# Patient Record
Sex: MALE | Race: WHITE | NOT HISPANIC OR LATINO | Employment: UNEMPLOYED | URBAN - METROPOLITAN AREA
[De-identification: names, ages, dates, MRNs, and addresses within clinical notes are randomized per-mention and may not be internally consistent; named-entity substitution may affect disease eponyms.]

---

## 2023-02-04 PROBLEM — I10 ESSENTIAL HYPERTENSION: Status: ACTIVE | Noted: 2023-02-04

## 2023-02-04 PROBLEM — E11.22 TYPE 2 DM WITH CKD STAGE 3 AND HYPERTENSION (MULTI): Status: ACTIVE | Noted: 2023-02-04

## 2023-02-04 PROBLEM — S69.92XA: Status: ACTIVE | Noted: 2023-02-04

## 2023-02-04 PROBLEM — I12.9 TYPE 2 DM WITH CKD STAGE 3 AND HYPERTENSION (MULTI): Status: ACTIVE | Noted: 2023-02-04

## 2023-02-04 PROBLEM — E78.5 HYPERLIPIDEMIA: Status: ACTIVE | Noted: 2023-02-04

## 2023-02-04 PROBLEM — L91.8 CUTANEOUS SKIN TAGS: Status: ACTIVE | Noted: 2023-02-04

## 2023-02-04 PROBLEM — N18.30 TYPE 2 DM WITH CKD STAGE 3 AND HYPERTENSION (MULTI): Status: ACTIVE | Noted: 2023-02-04

## 2023-02-04 PROBLEM — E66.01 SEVERE OBESITY (BMI 35.0-39.9) WITH COMORBIDITY (MULTI): Status: ACTIVE | Noted: 2023-02-04

## 2023-02-04 PROBLEM — J30.9 ALLERGIC RHINITIS: Status: ACTIVE | Noted: 2023-02-04

## 2023-02-04 PROBLEM — N18.30 CHRONIC KIDNEY DISEASE (CKD), STAGE III (MODERATE) (MULTI): Status: ACTIVE | Noted: 2023-02-04

## 2023-02-04 PROBLEM — K21.9 GERD (GASTROESOPHAGEAL REFLUX DISEASE): Status: ACTIVE | Noted: 2023-02-04

## 2023-02-04 PROBLEM — H52.13 MYOPIA OF BOTH EYES: Status: ACTIVE | Noted: 2023-02-04

## 2023-02-04 RX ORDER — LANCETS
EACH MISCELLANEOUS
COMMUNITY
End: 2023-12-20 | Stop reason: SDUPTHER

## 2023-02-04 RX ORDER — BLOOD SUGAR DIAGNOSTIC
STRIP MISCELLANEOUS
COMMUNITY
Start: 2020-04-20 | End: 2023-12-20 | Stop reason: SDUPTHER

## 2023-02-04 RX ORDER — ATORVASTATIN CALCIUM 40 MG/1
1 TABLET, FILM COATED ORAL DAILY
COMMUNITY
Start: 2013-11-22 | End: 2023-03-13

## 2023-02-04 RX ORDER — LOSARTAN POTASSIUM 25 MG/1
1 TABLET ORAL DAILY
COMMUNITY
Start: 2020-09-21 | End: 2023-03-23 | Stop reason: ALTCHOICE

## 2023-02-04 RX ORDER — SEMAGLUTIDE 1.34 MG/ML
1 INJECTION, SOLUTION SUBCUTANEOUS
COMMUNITY
Start: 2022-03-01 | End: 2023-03-23 | Stop reason: SDUPTHER

## 2023-02-04 RX ORDER — OMEPRAZOLE 20 MG/1
20 CAPSULE, DELAYED RELEASE ORAL
COMMUNITY
Start: 2017-07-25 | End: 2023-03-13

## 2023-03-12 DIAGNOSIS — I10 PRIMARY HYPERTENSION: ICD-10-CM

## 2023-03-12 DIAGNOSIS — K21.9 LARYNGOPHARYNGEAL REFLUX: ICD-10-CM

## 2023-03-12 DIAGNOSIS — E78.5 HYPERLIPIDEMIA, UNSPECIFIED HYPERLIPIDEMIA TYPE: ICD-10-CM

## 2023-03-13 RX ORDER — OMEPRAZOLE 20 MG/1
20 CAPSULE, DELAYED RELEASE ORAL
Qty: 90 CAPSULE | Refills: 0 | Status: SHIPPED | OUTPATIENT
Start: 2023-03-13 | End: 2023-03-23 | Stop reason: SDUPTHER

## 2023-03-13 RX ORDER — LOSARTAN POTASSIUM 50 MG/1
50 TABLET ORAL DAILY
Qty: 90 TABLET | Refills: 0 | Status: SHIPPED | OUTPATIENT
Start: 2023-03-13 | End: 2023-03-23 | Stop reason: SDUPTHER

## 2023-03-13 RX ORDER — ATORVASTATIN CALCIUM 40 MG/1
40 TABLET, FILM COATED ORAL DAILY
Qty: 90 TABLET | Refills: 0 | Status: SHIPPED | OUTPATIENT
Start: 2023-03-13 | End: 2023-03-23 | Stop reason: SDUPTHER

## 2023-03-23 ENCOUNTER — OFFICE VISIT (OUTPATIENT)
Dept: PRIMARY CARE | Facility: CLINIC | Age: 58
End: 2023-03-23
Payer: COMMERCIAL

## 2023-03-23 VITALS
HEART RATE: 65 BPM | SYSTOLIC BLOOD PRESSURE: 126 MMHG | HEIGHT: 75 IN | DIASTOLIC BLOOD PRESSURE: 80 MMHG | WEIGHT: 294 LBS | BODY MASS INDEX: 36.56 KG/M2

## 2023-03-23 DIAGNOSIS — K21.9 GASTROESOPHAGEAL REFLUX DISEASE WITHOUT ESOPHAGITIS: ICD-10-CM

## 2023-03-23 DIAGNOSIS — I10 ESSENTIAL HYPERTENSION: Primary | ICD-10-CM

## 2023-03-23 DIAGNOSIS — N18.30 TYPE 2 DM WITH CKD STAGE 3 AND HYPERTENSION (MULTI): ICD-10-CM

## 2023-03-23 DIAGNOSIS — K21.9 LARYNGOPHARYNGEAL REFLUX: ICD-10-CM

## 2023-03-23 DIAGNOSIS — E78.5 HYPERLIPIDEMIA, UNSPECIFIED HYPERLIPIDEMIA TYPE: ICD-10-CM

## 2023-03-23 DIAGNOSIS — I12.9 TYPE 2 DM WITH CKD STAGE 3 AND HYPERTENSION (MULTI): ICD-10-CM

## 2023-03-23 DIAGNOSIS — Z23 NEED FOR SHINGLES VACCINE: ICD-10-CM

## 2023-03-23 DIAGNOSIS — I10 PRIMARY HYPERTENSION: ICD-10-CM

## 2023-03-23 DIAGNOSIS — E11.22 TYPE 2 DM WITH CKD STAGE 3 AND HYPERTENSION (MULTI): ICD-10-CM

## 2023-03-23 PROCEDURE — 3079F DIAST BP 80-89 MM HG: CPT | Performed by: STUDENT IN AN ORGANIZED HEALTH CARE EDUCATION/TRAINING PROGRAM

## 2023-03-23 PROCEDURE — 90471 IMMUNIZATION ADMIN: CPT | Performed by: STUDENT IN AN ORGANIZED HEALTH CARE EDUCATION/TRAINING PROGRAM

## 2023-03-23 PROCEDURE — 3044F HG A1C LEVEL LT 7.0%: CPT | Performed by: STUDENT IN AN ORGANIZED HEALTH CARE EDUCATION/TRAINING PROGRAM

## 2023-03-23 PROCEDURE — 4010F ACE/ARB THERAPY RXD/TAKEN: CPT | Performed by: STUDENT IN AN ORGANIZED HEALTH CARE EDUCATION/TRAINING PROGRAM

## 2023-03-23 PROCEDURE — 3074F SYST BP LT 130 MM HG: CPT | Performed by: STUDENT IN AN ORGANIZED HEALTH CARE EDUCATION/TRAINING PROGRAM

## 2023-03-23 PROCEDURE — 99214 OFFICE O/P EST MOD 30 MIN: CPT | Performed by: STUDENT IN AN ORGANIZED HEALTH CARE EDUCATION/TRAINING PROGRAM

## 2023-03-23 PROCEDURE — 90750 HZV VACC RECOMBINANT IM: CPT | Performed by: STUDENT IN AN ORGANIZED HEALTH CARE EDUCATION/TRAINING PROGRAM

## 2023-03-23 PROCEDURE — 1036F TOBACCO NON-USER: CPT | Performed by: STUDENT IN AN ORGANIZED HEALTH CARE EDUCATION/TRAINING PROGRAM

## 2023-03-23 RX ORDER — SEMAGLUTIDE 1.34 MG/ML
1 INJECTION, SOLUTION SUBCUTANEOUS
Qty: 3 ML | Refills: 6 | Status: SHIPPED | OUTPATIENT
Start: 2023-03-23 | End: 2023-11-10 | Stop reason: ALTCHOICE

## 2023-03-23 RX ORDER — ATORVASTATIN CALCIUM 40 MG/1
40 TABLET, FILM COATED ORAL DAILY
Qty: 90 TABLET | Refills: 1 | Status: SHIPPED | OUTPATIENT
Start: 2023-03-23 | End: 2023-10-25 | Stop reason: SDUPTHER

## 2023-03-23 RX ORDER — OMEPRAZOLE 20 MG/1
20 CAPSULE, DELAYED RELEASE ORAL
Qty: 90 CAPSULE | Refills: 1 | Status: SHIPPED | OUTPATIENT
Start: 2023-03-23 | End: 2024-04-18 | Stop reason: SDUPTHER

## 2023-03-23 RX ORDER — LOSARTAN POTASSIUM 50 MG/1
50 TABLET ORAL DAILY
Qty: 90 TABLET | Refills: 1 | Status: SHIPPED | OUTPATIENT
Start: 2023-03-23 | End: 2023-10-25 | Stop reason: SDUPTHER

## 2023-03-23 NOTE — PROGRESS NOTES
Subjective   Patient ID: Cooper Gibbons is a 57 y.o. male who presents for Hypertension, Hyperlipidemia, and Diabetes.  Today he is accompanied by alone.     HPI    1.  Hypertension  Continues to take losartan 50 mg daily as indicated in the chart  This was just recently increased at his last physical  He has been doing very well with this medication  Today his blood pressure was well within normal limits at 126/80  Denies any cardiopulmonary symptoms  Requesting refills to his mail out pharmacy    2.  Diabetes mellitus with CKD 3  Continues to take Ozempic as indicated in the chart  Doing very well with this medication and his hemoglobin A1c is stable and below 6.5%  Feels well and requesting refills to be sent out to his mail out pharmacy  Does have a history of possibly having RIS and did an MRI which showed a negative result  He is happy to hear that this testing was normal  Wishes to discuss this slightly today as well    3.  GERD  Continues to take omeprazole 20 mg daily as an in the chart  Feels very well in regards to his acid reflux  Requesting refills and feels asymptomatic    4.  Hyperlipidemia  Continues to take atorvastatin 40 mg daily as indicated in chart  Cholesterol appears to be stable  Denies any myalgias  Requesting refills to his mail out pharmacy    5.  Need for shingles vaccine  Had his shingles vaccine #1 at his last visit/physical  Willing to get the second and final dosage today      Current Outpatient Medications on File Prior to Visit   Medication Sig Dispense Refill    blood sugar diagnostic (Accu-Chek Guide test strips) strip TEST TWICE DAILY; MORNING FASTING AND 1-2 HOURS AFTER LARGEST MEAL      lancets misc Chec kblood sugars 2 times daily      multivitamin with minerals tablet Take 1 tablet by mouth once daily.      needles, disposable (BD INTRADERMAL BEVEL NEEDLES MISC) Micro 32Gx 6mm; use as directed for once daily injection      [DISCONTINUED] atorvastatin (Lipitor) 40 mg tablet  "Take 1 tablet (40 mg) by mouth once daily. 90 tablet 0    [DISCONTINUED] losartan (Cozaar) 50 mg tablet Take 1 tablet (50 mg) by mouth once daily. 90 tablet 0    [DISCONTINUED] omeprazole (PriLOSEC) 20 mg DR capsule Take 1 capsule (20 mg) by mouth once daily in the morning. Take before meals. 90 capsule 0    [DISCONTINUED] semaglutide (Ozempic) 1 mg/dose (4 mg/3 mL) pen injector Inject 0.75 mL (1 mg) under the skin 1 (one) time per week.      NON FORMULARY Alcohol swabs: use as directed      [DISCONTINUED] losartan (Cozaar) 25 mg tablet Take 1 tablet (25 mg) by mouth once daily.       No current facility-administered medications on file prior to visit.          Review of Systems  All pertinent positive symptoms are included in the history of present illness.  All other systems have been reviewed and are negative and noncontributory to this patient's current ailments.     Objective   /80 (BP Location: Left arm, Patient Position: Sitting, BP Cuff Size: Large adult)   Pulse 65   Ht 1.905 m (6' 3\")   Wt 133 kg (294 lb)   BMI 36.75 kg/m²   BSA: 2.65 meters squared  No visits with results within 1 Month(s) from this visit.   Latest known visit with results is:   [facility] Encounter on 01/28/2023   Component Date Value Ref Range Status    Hemoglobin A1C 01/28/2023 6.2 (A)  % Final    Comment:      Diagnosis of Diabetes-Adults   Non-Diabetic: < or = 5.6%   Increased risk for developing diabetes: 5.7-6.4%   Diagnostic of diabetes: > or = 6.5%  .       Monitoring of Diabetes                Age (y)     Therapeutic Goal (%)   Adults:          >18           <7.0   Pediatrics:    13-18           <7.5                   7-12           <8.0                   0- 6            7.5-8.5   American Diabetes Association. Diabetes Care 33(S1), Jan 2010.      Estimated Average Glucose 01/28/2023 131  MG/DL Final    PSA 01/28/2023 0.69  0.00 - 4.00 ng/mL Final    Comment: The FDA requires that the method used for PSA assay be "   reported to the physician. Values obtained with different   assay methods must not be used interchangeably. This test   was performed at [facility] using the Siemens  OhLifellLookIt PSA method, which is a sandwich immunoassay using   chemiluminescence for quantitation. The assay is approved  for measurement of prostate-specific antigen (PSA) in   serum and may be used in conjunction with a digital rectal  examination in men 50 years and older as an aid in   detection of prostate cancer.   5-Alpha-reductase inhibitors (e.g. Proscar, Finasteride,   Avodart, Dutasteride and Brii) for the treatment of BPH   have been shown to lower PSA levels by an average of 50%   after 6 months of treatment.      Glucose 01/28/2023 123 (H)  74 - 99 mg/dL Final    Sodium 01/28/2023 140  136 - 145 mmol/L Final    Potassium 01/28/2023 4.1  3.5 - 5.3 mmol/L Final    Chloride 01/28/2023 105  98 - 107 mmol/L Final    Bicarbonate 01/28/2023 28  21 - 32 mmol/L Final    Anion Gap 01/28/2023 11  10 - 20 mmol/L Final    Urea Nitrogen 01/28/2023 17  6 - 23 mg/dL Final    Creatinine 01/28/2023 1.48 (H)  0.50 - 1.30 mg/dL Final    GFR MALE 01/28/2023 55 (A)  >90 mL/min/1.73m2 Final    Comment:  CALCULATIONS OF ESTIMATED GFR ARE PERFORMED   USING THE 2021 CKD-EPI STUDY REFIT EQUATION   WITHOUT THE RACE VARIABLE FOR THE IDMS-TRACEABLE   CREATININE METHODS.    https://jasn.asnjournals.org/content/early/2021/09/22/ASN.6625655826      Calcium 01/28/2023 9.3  8.6 - 10.6 mg/dL Final    Albumin 01/28/2023 4.1  3.4 - 5.0 g/dL Final    Alkaline Phosphatase 01/28/2023 116  33 - 120 U/L Final    Total Protein 01/28/2023 6.7  6.4 - 8.2 g/dL Final    AST 01/28/2023 20  9 - 39 U/L Final    Total Bilirubin 01/28/2023 0.8  0.0 - 1.2 mg/dL Final    ALT (SGPT) 01/28/2023 39  10 - 52 U/L Final    Comment:  Patients treated with Sulfasalazine may generate    falsely decreased results for ALT.      WBC 01/28/2023 5.9  4.4 - 11.3 x10E9/L Final    nRBC  01/28/2023 0.0  0.0 - 0.0 /100 WBC Final    RBC 01/28/2023 4.85  4.50 - 5.90 x10E12/L Final    Hemoglobin 01/28/2023 14.5  13.5 - 17.5 g/dL Final    Hematocrit 01/28/2023 44.5  41.0 - 52.0 % Final    MCV 01/28/2023 92  80 - 100 fL Final    MCHC 01/28/2023 32.6  32.0 - 36.0 g/dL Final    Platelets 01/28/2023 271  150 - 450 x10E9/L Final    RDW 01/28/2023 12.8  11.5 - 14.5 % Final    Neutrophils % 01/28/2023 62.0  40.0 - 80.0 % Final    Immature Granulocytes %, Automated 01/28/2023 0.0  0.0 - 0.9 % Final    Comment:  Immature Granulocyte Count (IG) includes promyelocytes,    myelocytes and metamyelocytes but does not include bands.   Percent differential counts (%) should be interpreted in the   context of the absolute cell counts (cells/L).      Lymphocytes % 01/28/2023 29.1  13.0 - 44.0 % Final    Monocytes % 01/28/2023 6.2  2.0 - 10.0 % Final    Eosinophils % 01/28/2023 2.2  0.0 - 6.0 % Final    Basophils % 01/28/2023 0.5  0.0 - 2.0 % Final    Neutrophils Absolute 01/28/2023 3.63  1.20 - 7.70 x10E9/L Final    Lymphocytes Absolute 01/28/2023 1.70  1.20 - 4.80 x10E9/L Final    Monocytes Absolute 01/28/2023 0.36  0.10 - 1.00 x10E9/L Final    Eosinophils Absolute 01/28/2023 0.13  0.00 - 0.70 x10E9/L Final    Basophils Absolute 01/28/2023 0.03  0.00 - 0.10 x10E9/L Final    Cholesterol 01/28/2023 145  0 - 199 mg/dL Final    Comment: .      AGE      DESIRABLE   BORDERLINE HIGH   HIGH     0-19 Y     0 - 169       170 - 199     >/= 200    20-24 Y     0 - 189       190 - 224     >/= 225         >24 Y     0 - 199       200 - 239     >/= 240   **All ranges are based on fasting samples. Specific   therapeutic targets will vary based on patient-specific   cardiac risk.  .   Pediatric guidelines reference:Pediatrics 2011, 128(S5).   Adult guidelines reference: NCEP ATPIII Guidelines,     ARLETTE 2001, 258:2486-97  .   Venipuncture immediately after or during the    administration of Metamizole may lead to falsely   low results.  Testing should be performed immediately   prior to Metamizole dosing.      HDL 01/28/2023 36.6 (A)  mg/dL Final    Comment: .      AGE      VERY LOW   LOW     NORMAL    HIGH       0-19 Y       < 35   < 40     40-45     ----    20-24 Y       ----   < 40       >45     ----      >24 Y       ----   < 40     40-60      >60  .      Cholesterol/HDL Ratio 01/28/2023 4.0   Final    Comment: REF VALUES  DESIRABLE  < 3.4  HIGH RISK  > 5.0      LDL 01/28/2023 67  0 - 99 mg/dL Final    Comment: .                           NEAR      BORD      AGE      DESIRABLE  OPTIMAL    HIGH     HIGH     VERY HIGH     0-19 Y     0 - 109     ---    110-129   >/= 130     ----    20-24 Y     0 - 119     ---    120-159   >/= 160     ----      >24 Y     0 -  99   100-129  130-159   160-189     >/=190  .      VLDL 01/28/2023 41 (H)  0 - 40 mg/dL Final    Triglycerides 01/28/2023 207 (H)  0 - 149 mg/dL Final    Comment: .      AGE      DESIRABLE   BORDERLINE HIGH   HIGH     VERY HIGH   0 D-90 D    19 - 174         ----         ----        ----  91 D- 9 Y     0 -  74        75 -  99     >/= 100      ----    10-19 Y     0 -  89        90 - 129     >/= 130      ----    20-24 Y     0 - 114       115 - 149     >/= 150      ----         >24 Y     0 - 149       150 - 199    200- 499    >/= 500  .   Venipuncture immediately after or during the    administration of Metamizole may lead to falsely   low results. Testing should be performed immediately   prior to Metamizole dosing.      Non HDL Cholesterol 01/28/2023 108  mg/dL Final    Comment:     AGE      DESIRABLE   BORDERLINE HIGH   HIGH     VERY HIGH     0-19 Y     0 - 119       120 - 144     >/= 145    >/= 160    20-24 Y     0 - 149       150 - 189     >/= 190      ----         >24 Y    30 MG/DL ABOVE LDL CHOLESTEROL GOAL  .      TSH 01/28/2023 1.83  0.44 - 3.98 mIU/L Final    Comment:  TSH testing is performed using different testing    methodology at [facility] than at other    system  [facility]. Direct result comparisons should    only be made within the same method.      Hepatitis C Ab 01/28/2023 NONREACTIVE  NONREACTIVE Final    Comment:  Results from patients taking biotin supplements or receiving   high-dose biotin therapy should be interpreted with caution   due to possible interference with this test. Providers may    contact their local laboratory for further information.      ALBUMIN (MG/L) IN URINE 01/28/2023 <7.0  Not Established mg/L Final    Albumin/Creatine Ratio 01/28/2023 SEE COMMENT  0.0 - 30.0 ug/mg crt Final    Comment: One or more analytes used in this calculation   is outside of the analytical measurement range.  Calculation cannot be performed.      Creatinine, Urine 01/28/2023 127.0  20.0 - 370.0 mg/dL Final       Physical Exam  CONSTITUTIONAL - well nourished, well developed, looks like stated age, in no acute distress, not ill-appearing, and not tired appearing  SKIN - normal skin color and pigmentation, normal skin turgor without rash, lesions, or nodules visualized  HEAD - no trauma, normocephalic  EYES - normal external exam  CHEST -no distressed breathing, good effort, heart regular rate and rhythm, no murmurs, rubs, or gallops, lungs clear to auscultation bilaterally, no wheezing, rhonchi, or any other acute findings  EXTREMITIES - no edema, no deformities  NEUROLOGICAL - normal balance, normal motor, no ataxia  PSYCHIATRIC - alert, pleasant and cordial, age-appropriate    Assessment/Plan   1.  Hypertension  Stable, no changes recommended at this time  Continue losartan 50 mg  Continue monitoring blood pressures at home with ideal range below 130/80    2.  Diabetes mellitus with CKD 3  Continue taking your Ozempic as currently prescribed  Refill sent to your mail out pharmacy  Sugar has been stable and no recommendations to change at this time    3.  GERD  Stable without any changes recommended  Continue omeprazole 20 mg  Refill sent to your mail out pharmacy    4.   Hyperlipidemia  Continue taking atorvastatin 40 mg daily without any changes  Lab work looks good and stable  This was sent out to your mail out pharmacy    5.  Need for shingles vaccine  All questions were answered and you were counseled on immunization(s) in detail and vaccination was provided  This was your second and final dosage of your shingles vaccine

## 2023-07-07 ENCOUNTER — TELEPHONE (OUTPATIENT)
Dept: PRIMARY CARE | Facility: CLINIC | Age: 58
End: 2023-07-07
Payer: COMMERCIAL

## 2023-07-07 NOTE — TELEPHONE ENCOUNTER
Pts glucose monitor is not working and is asking for a new Rx for a new monitor. He asked for the accucheck glucose monitor but could not find this specifically in Epic.

## 2023-10-25 DIAGNOSIS — I10 PRIMARY HYPERTENSION: ICD-10-CM

## 2023-10-25 DIAGNOSIS — E78.5 HYPERLIPIDEMIA, UNSPECIFIED HYPERLIPIDEMIA TYPE: ICD-10-CM

## 2023-10-25 DIAGNOSIS — N18.30 TYPE 2 DM WITH CKD STAGE 3 AND HYPERTENSION (MULTI): ICD-10-CM

## 2023-10-25 DIAGNOSIS — I12.9 TYPE 2 DM WITH CKD STAGE 3 AND HYPERTENSION (MULTI): ICD-10-CM

## 2023-10-25 DIAGNOSIS — E11.22 TYPE 2 DM WITH CKD STAGE 3 AND HYPERTENSION (MULTI): ICD-10-CM

## 2023-10-26 RX ORDER — ATORVASTATIN CALCIUM 40 MG/1
40 TABLET, FILM COATED ORAL DAILY
Qty: 30 TABLET | Refills: 0 | Status: SHIPPED | OUTPATIENT
Start: 2023-10-26 | End: 2023-11-10 | Stop reason: SDUPTHER

## 2023-10-26 RX ORDER — LOSARTAN POTASSIUM 50 MG/1
50 TABLET ORAL DAILY
Qty: 30 TABLET | Refills: 0 | Status: SHIPPED | OUTPATIENT
Start: 2023-10-26 | End: 2023-11-10 | Stop reason: SDUPTHER

## 2023-11-10 ENCOUNTER — OFFICE VISIT (OUTPATIENT)
Dept: PRIMARY CARE | Facility: CLINIC | Age: 58
End: 2023-11-10
Payer: COMMERCIAL

## 2023-11-10 VITALS
HEIGHT: 75 IN | HEART RATE: 84 BPM | WEIGHT: 301.2 LBS | DIASTOLIC BLOOD PRESSURE: 80 MMHG | BODY MASS INDEX: 37.45 KG/M2 | SYSTOLIC BLOOD PRESSURE: 138 MMHG

## 2023-11-10 DIAGNOSIS — E78.5 HYPERLIPIDEMIA, UNSPECIFIED HYPERLIPIDEMIA TYPE: ICD-10-CM

## 2023-11-10 DIAGNOSIS — K21.9 LARYNGOPHARYNGEAL REFLUX: ICD-10-CM

## 2023-11-10 DIAGNOSIS — E11.22 TYPE 2 DM WITH CKD STAGE 3 AND HYPERTENSION (MULTI): ICD-10-CM

## 2023-11-10 DIAGNOSIS — I10 PRIMARY HYPERTENSION: ICD-10-CM

## 2023-11-10 DIAGNOSIS — E66.01 SEVERE OBESITY (BMI 35.0-39.9) WITH COMORBIDITY (MULTI): ICD-10-CM

## 2023-11-10 DIAGNOSIS — N18.30 TYPE 2 DM WITH CKD STAGE 3 AND HYPERTENSION (MULTI): ICD-10-CM

## 2023-11-10 DIAGNOSIS — N18.30 STAGE 3 CHRONIC KIDNEY DISEASE, UNSPECIFIED WHETHER STAGE 3A OR 3B CKD (MULTI): ICD-10-CM

## 2023-11-10 DIAGNOSIS — I12.9 TYPE 2 DM WITH CKD STAGE 3 AND HYPERTENSION (MULTI): ICD-10-CM

## 2023-11-10 DIAGNOSIS — Z23 INFLUENZA VACCINE NEEDED: ICD-10-CM

## 2023-11-10 DIAGNOSIS — Z12.11 COLON CANCER SCREENING: ICD-10-CM

## 2023-11-10 DIAGNOSIS — Z23 NEED FOR PNEUMOCOCCAL VACCINE: ICD-10-CM

## 2023-11-10 DIAGNOSIS — Z00.00 HEALTHCARE MAINTENANCE: Primary | ICD-10-CM

## 2023-11-10 PROCEDURE — 90686 IIV4 VACC NO PRSV 0.5 ML IM: CPT | Performed by: STUDENT IN AN ORGANIZED HEALTH CARE EDUCATION/TRAINING PROGRAM

## 2023-11-10 PROCEDURE — 90677 PCV20 VACCINE IM: CPT | Performed by: STUDENT IN AN ORGANIZED HEALTH CARE EDUCATION/TRAINING PROGRAM

## 2023-11-10 PROCEDURE — 90471 IMMUNIZATION ADMIN: CPT | Performed by: STUDENT IN AN ORGANIZED HEALTH CARE EDUCATION/TRAINING PROGRAM

## 2023-11-10 PROCEDURE — 93000 ELECTROCARDIOGRAM COMPLETE: CPT | Performed by: STUDENT IN AN ORGANIZED HEALTH CARE EDUCATION/TRAINING PROGRAM

## 2023-11-10 PROCEDURE — 99214 OFFICE O/P EST MOD 30 MIN: CPT | Performed by: STUDENT IN AN ORGANIZED HEALTH CARE EDUCATION/TRAINING PROGRAM

## 2023-11-10 PROCEDURE — 3044F HG A1C LEVEL LT 7.0%: CPT | Performed by: STUDENT IN AN ORGANIZED HEALTH CARE EDUCATION/TRAINING PROGRAM

## 2023-11-10 PROCEDURE — 3075F SYST BP GE 130 - 139MM HG: CPT | Performed by: STUDENT IN AN ORGANIZED HEALTH CARE EDUCATION/TRAINING PROGRAM

## 2023-11-10 PROCEDURE — 90472 IMMUNIZATION ADMIN EACH ADD: CPT | Performed by: STUDENT IN AN ORGANIZED HEALTH CARE EDUCATION/TRAINING PROGRAM

## 2023-11-10 PROCEDURE — 4010F ACE/ARB THERAPY RXD/TAKEN: CPT | Performed by: STUDENT IN AN ORGANIZED HEALTH CARE EDUCATION/TRAINING PROGRAM

## 2023-11-10 PROCEDURE — 99396 PREV VISIT EST AGE 40-64: CPT | Performed by: STUDENT IN AN ORGANIZED HEALTH CARE EDUCATION/TRAINING PROGRAM

## 2023-11-10 PROCEDURE — 3079F DIAST BP 80-89 MM HG: CPT | Performed by: STUDENT IN AN ORGANIZED HEALTH CARE EDUCATION/TRAINING PROGRAM

## 2023-11-10 PROCEDURE — 1036F TOBACCO NON-USER: CPT | Performed by: STUDENT IN AN ORGANIZED HEALTH CARE EDUCATION/TRAINING PROGRAM

## 2023-11-10 RX ORDER — ATORVASTATIN CALCIUM 40 MG/1
40 TABLET, FILM COATED ORAL DAILY
Qty: 90 TABLET | Refills: 1 | Status: SHIPPED | OUTPATIENT
Start: 2023-11-10 | End: 2024-04-18 | Stop reason: SDUPTHER

## 2023-11-10 RX ORDER — OMEPRAZOLE 20 MG/1
20 CAPSULE, DELAYED RELEASE ORAL
Qty: 90 CAPSULE | Refills: 1 | Status: CANCELLED | OUTPATIENT
Start: 2023-11-10

## 2023-11-10 RX ORDER — SEMAGLUTIDE 1.34 MG/ML
1 INJECTION, SOLUTION SUBCUTANEOUS
Qty: 3 ML | Refills: 6 | Status: CANCELLED | OUTPATIENT
Start: 2023-11-10

## 2023-11-10 RX ORDER — LOSARTAN POTASSIUM 100 MG/1
100 TABLET ORAL DAILY
Qty: 90 TABLET | Refills: 1 | Status: SHIPPED | OUTPATIENT
Start: 2023-11-10 | End: 2024-04-18 | Stop reason: SDUPTHER

## 2023-11-10 ASSESSMENT — PATIENT HEALTH QUESTIONNAIRE - PHQ9
2. FEELING DOWN, DEPRESSED OR HOPELESS: NOT AT ALL
SUM OF ALL RESPONSES TO PHQ9 QUESTIONS 1 AND 2: 0
1. LITTLE INTEREST OR PLEASURE IN DOING THINGS: NOT AT ALL

## 2023-11-10 NOTE — PROGRESS NOTES
"Subjective   Patient ID: Cooper Gibbons \"Alirio\" is a 58 y.o. male who presents for Annual Exam.  Today he is accompanied by alone.     HPI  Currently works for [employer]    1.  Healthcare maintenance  Overall patient is doing well.   Immunization: Tdap June 2022; influenza vaccine willing today  Shingrix up-to-date x2; PPSV 23 2021  Willing to discuss PCV 20 and have this provided as well  COVID-19 vaccine x2  Colon Cancer Screening: No family history; Cologuard 2020, due 2023, requesting requisition  Diet: Attempting to eat a better balanced diet  Exercise: Attempting to exercise regularly  Tobacco: Denies use  EtOH: Rarely  Denies any other acute signs/symptoms and willing to do blood work in the near future     2. Hypertension  Continues to take losartan 50 mg daily as indicated in the chart  He has been doing very well with this medication  Today his blood pressure was slightly increased  Denies any cardiopulmonary symptoms  Requesting refills to his mail out pharmacy and willing to increase if necessary     3. Diabetes mellitus with CKD 3  Continues to take Ozempic as indicated in the chart  Doing very well with this medication and his hemoglobin A1c is stable and at 6.2% previously  Feels well and requesting refills to be sent out to his mail out pharmacy  Does have a history of possibly having RIS and did an MRI which showed a negative result  He is happy to hear that this testing was normal  Wishes to discuss this further and even mention about possibility of increasing Ozempic to help with weight loss and further control  Asking if it would be appropriate to increase to 2 mg weekly     4.  GERD  Continues to take omeprazole 20 mg daily as an in the chart  Feels very well in regards to his acid reflux  Does not need refills and states that he uses this on an as-needed basis     5. Hyperlipidemia  Continues to take atorvastatin 40 mg daily as indicated in chart  Cholesterol appears to be " stable  Denies any myalgias  Requesting refills to his mail out pharmacy  Has yet to do a CT cardiac score willing to do so after today's visit    Current Outpatient Medications on File Prior to Visit   Medication Sig Dispense Refill    blood sugar diagnostic (Accu-Chek Guide test strips) strip TEST TWICE DAILY; MORNING FASTING AND 1-2 HOURS AFTER LARGEST MEAL      lancets misc Chec kblood sugars 2 times daily      multivitamin with minerals tablet Take 1 tablet by mouth once daily.      needles, disposable (BD INTRADERMAL BEVEL NEEDLES MISC) Micro 32Gx 6mm; use as directed for once daily injection      NON FORMULARY Alcohol swabs: use as directed      omeprazole (PriLOSEC) 20 mg DR capsule Take 1 capsule (20 mg) by mouth once daily in the morning. Take before meals. 90 capsule 1    [DISCONTINUED] atorvastatin (Lipitor) 40 mg tablet Take 1 tablet (40 mg) by mouth once daily. 30 tablet 0    [DISCONTINUED] losartan (Cozaar) 50 mg tablet Take 1 tablet (50 mg) by mouth once daily. 30 tablet 0    [DISCONTINUED] semaglutide (Ozempic) 1 mg/dose (4 mg/3 mL) pen injector Inject 0.75 mL (1 mg) under the skin 1 (one) time per week. 3 mL 6     No current facility-administered medications on file prior to visit.        No Known Allergies    Immunization History   Administered Date(s) Administered    Flu vaccine (IIV4), preservative free *Check age/dose* 11/10/2023    Hep A, Unspecified 11/22/2013    Hepatitis B vaccine, adult (RECOMBIVAX, ENGERIX) 11/22/2013, 01/15/2014, 05/06/2014    Influenza, seasonal, injectable 10/26/2022    Pfizer Gray Cap SARS-CoV-2 04/09/2022    Pfizer Purple Cap SARS-CoV-2 05/01/2021, 05/22/2021    Pneumococcal conjugate vaccine, 20-valent (PREVNAR 20) 11/10/2023    Pneumococcal polysaccharide vaccine, 23-valent, age 2 years and older (PNEUMOVAX 23) 09/01/2021    SARS-CoV-2, Unspecified 04/09/2022    Tdap vaccine, age 7 year and older (BOOSTRIX) 08/19/2011, 06/05/2022    Zoster vaccine, recombinant,  "adult (SHINGRIX) 10/26/2022, 03/23/2023         Review of Systems  All pertinent positive symptoms are included in the history of present illness.  All other systems have been reviewed and are negative and noncontributory to this patient's current ailments.     Objective   /80 (BP Location: Left arm, Patient Position: Sitting, BP Cuff Size: Large adult)   Pulse 84   Ht 1.905 m (6' 3\")   Wt 137 kg (301 lb 3.2 oz)   BMI 37.65 kg/m²   BSA: 2.69 meters squared  No visits with results within 1 Month(s) from this visit.   Latest known visit with results is:   Legacy Encounter on 01/28/2023   Component Date Value Ref Range Status    Hemoglobin A1C 01/28/2023 6.2 (A)  % Final    Comment:      Diagnosis of Diabetes-Adults   Non-Diabetic: < or = 5.6%   Increased risk for developing diabetes: 5.7-6.4%   Diagnostic of diabetes: > or = 6.5%  .       Monitoring of Diabetes                Age (y)     Therapeutic Goal (%)   Adults:          >18           <7.0   Pediatrics:    13-18           <7.5                   7-12           <8.0                   0- 6            7.5-8.5   American Diabetes Association. Diabetes Care 33(S1), Jan 2010.      Estimated Average Glucose 01/28/2023 131  MG/DL Final    PSA 01/28/2023 0.69  0.00 - 4.00 ng/mL Final    Comment: The FDA requires that the method used for PSA assay be   reported to the physician. Values obtained with different   assay methods must not be used interchangeably. This test   was performed at [facility] using the Siemens  [email or web address] PSA method, which is a sandwich immunoassay using   chemiluminescence for quantitation. The assay is approved  for measurement of prostate-specific antigen (PSA) in   serum and may be used in conjunction with a digital rectal  examination in men 50 years and older as an aid in   detection of prostate cancer.   5-Alpha-reductase inhibitors (e.g. Proscar, Finasteride,   Avodart, Dutasteride and Brii) for the treatment of BPH "   have been shown to lower PSA levels by an average of 50%   after 6 months of treatment.      Glucose 01/28/2023 123 (H)  74 - 99 mg/dL Final    Sodium 01/28/2023 140  136 - 145 mmol/L Final    Potassium 01/28/2023 4.1  3.5 - 5.3 mmol/L Final    Chloride 01/28/2023 105  98 - 107 mmol/L Final    Bicarbonate 01/28/2023 28  21 - 32 mmol/L Final    Anion Gap 01/28/2023 11  10 - 20 mmol/L Final    Urea Nitrogen 01/28/2023 17  6 - 23 mg/dL Final    Creatinine 01/28/2023 1.48 (H)  0.50 - 1.30 mg/dL Final    GFR MALE 01/28/2023 55 (A)  >90 mL/min/1.73m2 Final    Comment:  CALCULATIONS OF ESTIMATED GFR ARE PERFORMED   USING THE 2021 CKD-EPI STUDY REFIT EQUATION   WITHOUT THE RACE VARIABLE FOR THE IDMS-TRACEABLE   CREATININE METHODS.    https://jasn.asnjournals.org/content/early/2021/09/22/ASN.4541509067      Calcium 01/28/2023 9.3  8.6 - 10.6 mg/dL Final    Albumin 01/28/2023 4.1  3.4 - 5.0 g/dL Final    Alkaline Phosphatase 01/28/2023 116  33 - 120 U/L Final    Total Protein 01/28/2023 6.7  6.4 - 8.2 g/dL Final    AST 01/28/2023 20  9 - 39 U/L Final    Total Bilirubin 01/28/2023 0.8  0.0 - 1.2 mg/dL Final    ALT (SGPT) 01/28/2023 39  10 - 52 U/L Final    Comment:  Patients treated with Sulfasalazine may generate    falsely decreased results for ALT.      WBC 01/28/2023 5.9  4.4 - 11.3 x10E9/L Final    nRBC 01/28/2023 0.0  0.0 - 0.0 /100 WBC Final    RBC 01/28/2023 4.85  4.50 - 5.90 x10E12/L Final    Hemoglobin 01/28/2023 14.5  13.5 - 17.5 g/dL Final    Hematocrit 01/28/2023 44.5  41.0 - 52.0 % Final    MCV 01/28/2023 92  80 - 100 fL Final    MCHC 01/28/2023 32.6  32.0 - 36.0 g/dL Final    Platelets 01/28/2023 271  150 - 450 x10E9/L Final    RDW 01/28/2023 12.8  11.5 - 14.5 % Final    Neutrophils % 01/28/2023 62.0  40.0 - 80.0 % Final    Immature Granulocytes %, Automated 01/28/2023 0.0  0.0 - 0.9 % Final    Comment:  Immature Granulocyte Count (IG) includes promyelocytes,    myelocytes and metamyelocytes but does not  include bands.   Percent differential counts (%) should be interpreted in the   context of the absolute cell counts (cells/L).      Lymphocytes % 01/28/2023 29.1  13.0 - 44.0 % Final    Monocytes % 01/28/2023 6.2  2.0 - 10.0 % Final    Eosinophils % 01/28/2023 2.2  0.0 - 6.0 % Final    Basophils % 01/28/2023 0.5  0.0 - 2.0 % Final    Neutrophils Absolute 01/28/2023 3.63  1.20 - 7.70 x10E9/L Final    Lymphocytes Absolute 01/28/2023 1.70  1.20 - 4.80 x10E9/L Final    Monocytes Absolute 01/28/2023 0.36  0.10 - 1.00 x10E9/L Final    Eosinophils Absolute 01/28/2023 0.13  0.00 - 0.70 x10E9/L Final    Basophils Absolute 01/28/2023 0.03  0.00 - 0.10 x10E9/L Final    Cholesterol 01/28/2023 145  0 - 199 mg/dL Final    Comment: .      AGE      DESIRABLE   BORDERLINE HIGH   HIGH     0-19 Y     0 - 169       170 - 199     >/= 200    20-24 Y     0 - 189       190 - 224     >/= 225         >24 Y     0 - 199       200 - 239     >/= 240   **All ranges are based on fasting samples. Specific   therapeutic targets will vary based on patient-specific   cardiac risk.  .   Pediatric guidelines reference:Pediatrics 2011, 128(S5).   Adult guidelines reference: NCEP ATPIII Guidelines,     ARLETTE 2001, 258:2486-97  .   Venipuncture immediately after or during the    administration of Metamizole may lead to falsely   low results. Testing should be performed immediately   prior to Metamizole dosing.      HDL 01/28/2023 36.6 (A)  mg/dL Final    Comment: .      AGE      VERY LOW   LOW     NORMAL    HIGH       0-19 Y       < 35   < 40     40-45     ----    20-24 Y       ----   < 40       >45     ----      >24 Y       ----   < 40     40-60      >60  .      Cholesterol/HDL Ratio 01/28/2023 4.0   Final    Comment: REF VALUES  DESIRABLE  < 3.4  HIGH RISK  > 5.0      LDL 01/28/2023 67  0 - 99 mg/dL Final    Comment: .                           NEAR      BORD      AGE      DESIRABLE  OPTIMAL    HIGH     HIGH     VERY HIGH     0-19 Y     0 - 109     ---     110-129   >/= 130     ----    20-24 Y     0 - 119     ---    120-159   >/= 160     ----      >24 Y     0 -  99   100-129  130-159   160-189     >/=190  .      VLDL 01/28/2023 41 (H)  0 - 40 mg/dL Final    Triglycerides 01/28/2023 207 (H)  0 - 149 mg/dL Final    Comment: .      AGE      DESIRABLE   BORDERLINE HIGH   HIGH     VERY HIGH   0 D-90 D    19 - 174         ----         ----        ----  91 D- 9 Y     0 -  74        75 -  99     >/= 100      ----    10-19 Y     0 -  89        90 - 129     >/= 130      ----    20-24 Y     0 - 114       115 - 149     >/= 150      ----         >24 Y     0 - 149       150 - 199    200- 499    >/= 500  .   Venipuncture immediately after or during the    administration of Metamizole may lead to falsely   low results. Testing should be performed immediately   prior to Metamizole dosing.      Non HDL Cholesterol 01/28/2023 108  mg/dL Final    Comment:     AGE      DESIRABLE   BORDERLINE HIGH   HIGH     VERY HIGH     0-19 Y     0 - 119       120 - 144     >/= 145    >/= 160    20-24 Y     0 - 149       150 - 189     >/= 190      ----         >24 Y    30 MG/DL ABOVE LDL CHOLESTEROL GOAL  .      TSH 01/28/2023 1.83  0.44 - 3.98 mIU/L Final    Comment:  TSH testing is performed using different testing    methodology at [facility] than at other    [facility]. Direct result comparisons should    only be made within the same method.      Hepatitis C Ab 01/28/2023 NONREACTIVE  NONREACTIVE Final    Comment:  Results from patients taking biotin supplements or receiving   high-dose biotin therapy should be interpreted with caution   due to possible interference with this test. Providers may    contact their local laboratory for further information.      ALBUMIN (MG/L) IN URINE 01/28/2023 <7.0  Not Established mg/L Final    Albumin/Creatine Ratio 01/28/2023 SEE COMMENT  0.0 - 30.0 ug/mg crt Final    Comment: One or more analytes used in this calculation   is outside of the  analytical measurement range.  Calculation cannot be performed.      Creatinine, Urine 01/28/2023 127.0  20.0 - 370.0 mg/dL Final       Physical Exam  CONSTITUTIONAL - well nourished, well developed, looks like stated age, in no acute distress, not ill-appearing, and not tired appearing  SKIN - normal skin color and pigmentation, normal skin turgor without rash, lesions, or nodules visualized  HEAD - no trauma, normocephalic  EYES - normal external exam  ENT - TM's intact, no injection, no signs of infection, uvula midline, normal tongue movement and throat normal, no exudate, nasal passage without discharge and patent  NECK - supple without rigidity, no neck mass was observed, no thyromegaly or thyroid nodules  CHEST - clear to auscultation, no wheezing, no crackles and no rales, good effort  CARDIAC - regular rate and regular rhythm, no skipped beats, no murmur  ABDOMEN - no organomegaly, soft, nontender, nondistended, normal bowel sounds, no guarding/rebound/rigidity, negative McBurney sign and negative Goodrich sign  EXTREMITIES - no edema, no deformities  NEUROLOGICAL - normal gait, normal balance, normal motor, no ataxia  PSYCHIATRIC - alert, pleasant and cordial, age-appropriate  IMMUNOLOGIC - no cervical lymphadenopathy     Assessment/Plan   1. Health maintenance  Complete history and physical examination was performed  EKG reveals normal sinus rhythm without acute changes  We will notify of test results once available and make treatment recommendations accordingly   Yuval due 2023, reordered after today's visit  Please attempt eat a well-balanced diet and exercise regularly    2. Hypertension  Blood pressure noted to be slightly elevated  We will increase your losartan to 100 mg daily  Continue monitoring blood pressures at home with ideal range below 130/80     3.  Diabetes mellitus with CKD 3  Continue taking your Ozempic as currently prescribed  We will increase this to 2 mg once weekly  Refill sent to  your mail out pharmacy     4.  GERD  Stable without any changes recommended  Continue omeprazole 20 mg  Please notify us if/when refills are needed     5.  Hyperlipidemia  Continue taking atorvastatin 40 mg daily without any changes  Lab work looks good and stable  This was sent out to your mail out pharmacy  We also ordered a CT cardiac score after today's visit    6.  Vaccines Needed   Both an Influenza and PCV 20 were provided to you today

## 2023-11-21 ENCOUNTER — PHARMACY VISIT (OUTPATIENT)
Dept: PHARMACY | Facility: CLINIC | Age: 58
End: 2023-11-21
Payer: COMMERCIAL

## 2023-11-21 ENCOUNTER — TELEPHONE (OUTPATIENT)
Dept: PHARMACY | Facility: HOSPITAL | Age: 58
End: 2023-11-21
Payer: COMMERCIAL

## 2023-11-21 PROCEDURE — RXMED WILLOW AMBULATORY MEDICATION CHARGE

## 2023-11-21 NOTE — TELEPHONE ENCOUNTER
Spoke with patient regarding ozempic 2 mg backorder.     Advised patient to call  [facility], provided phone number, to avoid lapse in therapy.     Thanks,   Sera Ferraro

## 2023-12-14 PROCEDURE — RXMED WILLOW AMBULATORY MEDICATION CHARGE

## 2023-12-15 ENCOUNTER — PHARMACY VISIT (OUTPATIENT)
Dept: PHARMACY | Facility: CLINIC | Age: 58
End: 2023-12-15
Payer: COMMERCIAL

## 2023-12-16 LAB — NONINV COLON CA DNA+OCC BLD SCRN STL QL: NEGATIVE

## 2023-12-17 NOTE — RESULT ENCOUNTER NOTE
Yuval for colon cancer screening is negative  Will continue screening every 3 years, due December 2026

## 2023-12-20 DIAGNOSIS — I12.9 TYPE 2 DM WITH CKD STAGE 3 AND HYPERTENSION (MULTI): ICD-10-CM

## 2023-12-20 DIAGNOSIS — E66.01 SEVERE OBESITY (BMI 35.0-39.9) WITH COMORBIDITY (MULTI): ICD-10-CM

## 2023-12-20 DIAGNOSIS — N18.30 TYPE 2 DM WITH CKD STAGE 3 AND HYPERTENSION (MULTI): ICD-10-CM

## 2023-12-20 DIAGNOSIS — E11.22 TYPE 2 DM WITH CKD STAGE 3 AND HYPERTENSION (MULTI): ICD-10-CM

## 2023-12-27 RX ORDER — LANCETS
1 EACH MISCELLANEOUS 2 TIMES DAILY
Qty: 100 EACH | Refills: 1 | Status: SHIPPED | OUTPATIENT
Start: 2023-12-27 | End: 2024-03-07

## 2023-12-27 RX ORDER — BLOOD SUGAR DIAGNOSTIC
1 STRIP MISCELLANEOUS 2 TIMES DAILY
Qty: 100 STRIP | Refills: 1 | Status: SHIPPED | OUTPATIENT
Start: 2023-12-27 | End: 2024-03-07

## 2024-02-03 DIAGNOSIS — I12.9 TYPE 2 DM WITH CKD STAGE 3 AND HYPERTENSION (MULTI): ICD-10-CM

## 2024-02-03 DIAGNOSIS — E11.22 TYPE 2 DM WITH CKD STAGE 3 AND HYPERTENSION (MULTI): ICD-10-CM

## 2024-02-03 DIAGNOSIS — N18.30 TYPE 2 DM WITH CKD STAGE 3 AND HYPERTENSION (MULTI): ICD-10-CM

## 2024-02-05 RX ORDER — BLOOD SUGAR DIAGNOSTIC
1 STRIP MISCELLANEOUS 2 TIMES DAILY
Qty: 100 STRIP | Refills: 1 | OUTPATIENT
Start: 2024-02-05

## 2024-02-05 RX ORDER — LANCETS
1 EACH MISCELLANEOUS 2 TIMES DAILY
Qty: 100 EACH | Refills: 1 | OUTPATIENT
Start: 2024-02-05

## 2024-03-06 DIAGNOSIS — E11.22 TYPE 2 DM WITH CKD STAGE 3 AND HYPERTENSION (MULTI): ICD-10-CM

## 2024-03-06 DIAGNOSIS — I12.9 TYPE 2 DM WITH CKD STAGE 3 AND HYPERTENSION (MULTI): ICD-10-CM

## 2024-03-06 DIAGNOSIS — N18.30 TYPE 2 DM WITH CKD STAGE 3 AND HYPERTENSION (MULTI): ICD-10-CM

## 2024-03-07 RX ORDER — LANCETS
EACH MISCELLANEOUS
Qty: 200 EACH | Refills: 1 | Status: SHIPPED | OUTPATIENT
Start: 2024-03-07 | End: 2024-04-18 | Stop reason: SDUPTHER

## 2024-03-07 RX ORDER — BLOOD SUGAR DIAGNOSTIC
STRIP MISCELLANEOUS
Qty: 200 STRIP | Refills: 1 | Status: SHIPPED | OUTPATIENT
Start: 2024-03-07 | End: 2024-04-18 | Stop reason: SDUPTHER

## 2024-03-21 DIAGNOSIS — E66.01 SEVERE OBESITY (BMI 35.0-39.9) WITH COMORBIDITY (MULTI): ICD-10-CM

## 2024-03-21 DIAGNOSIS — I12.9 TYPE 2 DM WITH CKD STAGE 3 AND HYPERTENSION (MULTI): ICD-10-CM

## 2024-03-21 DIAGNOSIS — N18.30 TYPE 2 DM WITH CKD STAGE 3 AND HYPERTENSION (MULTI): ICD-10-CM

## 2024-03-21 DIAGNOSIS — E11.22 TYPE 2 DM WITH CKD STAGE 3 AND HYPERTENSION (MULTI): ICD-10-CM

## 2024-04-06 ENCOUNTER — LAB (OUTPATIENT)
Dept: LAB | Facility: LAB | Age: 59
End: 2024-04-06
Payer: COMMERCIAL

## 2024-04-06 DIAGNOSIS — K21.9 LARYNGOPHARYNGEAL REFLUX: ICD-10-CM

## 2024-04-06 DIAGNOSIS — N18.30 TYPE 2 DM WITH CKD STAGE 3 AND HYPERTENSION (MULTI): ICD-10-CM

## 2024-04-06 DIAGNOSIS — E66.01 SEVERE OBESITY (BMI 35.0-39.9) WITH COMORBIDITY (MULTI): ICD-10-CM

## 2024-04-06 DIAGNOSIS — E78.5 HYPERLIPIDEMIA, UNSPECIFIED HYPERLIPIDEMIA TYPE: ICD-10-CM

## 2024-04-06 DIAGNOSIS — I10 PRIMARY HYPERTENSION: ICD-10-CM

## 2024-04-06 DIAGNOSIS — I12.9 TYPE 2 DM WITH CKD STAGE 3 AND HYPERTENSION (MULTI): ICD-10-CM

## 2024-04-06 DIAGNOSIS — E11.22 TYPE 2 DM WITH CKD STAGE 3 AND HYPERTENSION (MULTI): ICD-10-CM

## 2024-04-06 DIAGNOSIS — Z00.00 HEALTHCARE MAINTENANCE: ICD-10-CM

## 2024-04-06 DIAGNOSIS — N18.30 STAGE 3 CHRONIC KIDNEY DISEASE, UNSPECIFIED WHETHER STAGE 3A OR 3B CKD (MULTI): ICD-10-CM

## 2024-04-06 PROCEDURE — 80061 LIPID PANEL: CPT

## 2024-04-06 PROCEDURE — 83036 HEMOGLOBIN GLYCOSYLATED A1C: CPT

## 2024-04-06 PROCEDURE — 80053 COMPREHEN METABOLIC PANEL: CPT

## 2024-04-06 PROCEDURE — 36415 COLL VENOUS BLD VENIPUNCTURE: CPT

## 2024-04-07 LAB
ALBUMIN SERPL BCP-MCNC: 4.3 G/DL (ref 3.4–5)
ALP SERPL-CCNC: 103 U/L (ref 33–120)
ALT SERPL W P-5'-P-CCNC: 27 U/L (ref 10–52)
ANION GAP SERPL CALC-SCNC: 15 MMOL/L (ref 10–20)
AST SERPL W P-5'-P-CCNC: 15 U/L (ref 9–39)
BILIRUB SERPL-MCNC: 0.8 MG/DL (ref 0–1.2)
BUN SERPL-MCNC: 26 MG/DL (ref 6–23)
CALCIUM SERPL-MCNC: 9.9 MG/DL (ref 8.6–10.6)
CHLORIDE SERPL-SCNC: 105 MMOL/L (ref 98–107)
CHOLEST SERPL-MCNC: 144 MG/DL (ref 0–199)
CHOLESTEROL/HDL RATIO: 3.7
CO2 SERPL-SCNC: 26 MMOL/L (ref 21–32)
CREAT SERPL-MCNC: 1.72 MG/DL (ref 0.5–1.3)
EGFRCR SERPLBLD CKD-EPI 2021: 45 ML/MIN/1.73M*2
EST. AVERAGE GLUCOSE BLD GHB EST-MCNC: 148 MG/DL
GLUCOSE SERPL-MCNC: 136 MG/DL (ref 74–99)
HBA1C MFR BLD: 6.8 %
HDLC SERPL-MCNC: 39.4 MG/DL
LDLC SERPL CALC-MCNC: 77 MG/DL
NON HDL CHOLESTEROL: 105 MG/DL (ref 0–149)
POTASSIUM SERPL-SCNC: 5 MMOL/L (ref 3.5–5.3)
PROT SERPL-MCNC: 6.9 G/DL (ref 6.4–8.2)
SODIUM SERPL-SCNC: 141 MMOL/L (ref 136–145)
TRIGL SERPL-MCNC: 136 MG/DL (ref 0–149)
VLDL: 27 MG/DL (ref 0–40)

## 2024-04-07 NOTE — RESULT ENCOUNTER NOTE
Sugar is elevated 136 but otherwise liver and electrolytes within normal limits    Kidney function continues to remain decreased at 45% and an elevated BUN/creatinine that appears to have slightly worsened    Hemoglobin A1c did increase as well at 6.8% with previous at 6.2%    Cholesterol looks stable at 144, HDL 39, LDL 77, triglycerides 136    I recommend he comes into the office in the near future to discuss this further so we can provide refills as well as make adjustments to his medication

## 2024-04-18 ENCOUNTER — OFFICE VISIT (OUTPATIENT)
Dept: PRIMARY CARE | Facility: CLINIC | Age: 59
End: 2024-04-18
Payer: COMMERCIAL

## 2024-04-18 VITALS
SYSTOLIC BLOOD PRESSURE: 138 MMHG | HEIGHT: 75 IN | HEART RATE: 90 BPM | DIASTOLIC BLOOD PRESSURE: 82 MMHG | BODY MASS INDEX: 36.18 KG/M2 | WEIGHT: 291 LBS | OXYGEN SATURATION: 99 %

## 2024-04-18 DIAGNOSIS — E78.5 HYPERLIPIDEMIA, UNSPECIFIED HYPERLIPIDEMIA TYPE: ICD-10-CM

## 2024-04-18 DIAGNOSIS — N18.30 TYPE 2 DM WITH CKD STAGE 3 AND HYPERTENSION (MULTI): ICD-10-CM

## 2024-04-18 DIAGNOSIS — K21.9 LARYNGOPHARYNGEAL REFLUX: ICD-10-CM

## 2024-04-18 DIAGNOSIS — I10 PRIMARY HYPERTENSION: ICD-10-CM

## 2024-04-18 DIAGNOSIS — E66.01 SEVERE OBESITY (BMI 35.0-39.9) WITH COMORBIDITY (MULTI): ICD-10-CM

## 2024-04-18 DIAGNOSIS — E11.22 TYPE 2 DM WITH CKD STAGE 3 AND HYPERTENSION (MULTI): ICD-10-CM

## 2024-04-18 DIAGNOSIS — I12.9 TYPE 2 DM WITH CKD STAGE 3 AND HYPERTENSION (MULTI): ICD-10-CM

## 2024-04-18 PROCEDURE — 3048F LDL-C <100 MG/DL: CPT | Performed by: STUDENT IN AN ORGANIZED HEALTH CARE EDUCATION/TRAINING PROGRAM

## 2024-04-18 PROCEDURE — 1036F TOBACCO NON-USER: CPT | Performed by: STUDENT IN AN ORGANIZED HEALTH CARE EDUCATION/TRAINING PROGRAM

## 2024-04-18 PROCEDURE — 3079F DIAST BP 80-89 MM HG: CPT | Performed by: STUDENT IN AN ORGANIZED HEALTH CARE EDUCATION/TRAINING PROGRAM

## 2024-04-18 PROCEDURE — 4010F ACE/ARB THERAPY RXD/TAKEN: CPT | Performed by: STUDENT IN AN ORGANIZED HEALTH CARE EDUCATION/TRAINING PROGRAM

## 2024-04-18 PROCEDURE — 3075F SYST BP GE 130 - 139MM HG: CPT | Performed by: STUDENT IN AN ORGANIZED HEALTH CARE EDUCATION/TRAINING PROGRAM

## 2024-04-18 PROCEDURE — 3044F HG A1C LEVEL LT 7.0%: CPT | Performed by: STUDENT IN AN ORGANIZED HEALTH CARE EDUCATION/TRAINING PROGRAM

## 2024-04-18 PROCEDURE — 99214 OFFICE O/P EST MOD 30 MIN: CPT | Performed by: STUDENT IN AN ORGANIZED HEALTH CARE EDUCATION/TRAINING PROGRAM

## 2024-04-18 RX ORDER — LOSARTAN POTASSIUM 100 MG/1
100 TABLET ORAL DAILY
Qty: 90 TABLET | Refills: 1 | Status: SHIPPED | OUTPATIENT
Start: 2024-04-18

## 2024-04-18 RX ORDER — OMEPRAZOLE 20 MG/1
20 CAPSULE, DELAYED RELEASE ORAL
Qty: 90 CAPSULE | Refills: 1 | Status: SHIPPED | OUTPATIENT
Start: 2024-04-18

## 2024-04-18 RX ORDER — BLOOD SUGAR DIAGNOSTIC
STRIP MISCELLANEOUS
Qty: 200 STRIP | Refills: 1 | Status: SHIPPED | OUTPATIENT
Start: 2024-04-18

## 2024-04-18 RX ORDER — ATORVASTATIN CALCIUM 40 MG/1
40 TABLET, FILM COATED ORAL DAILY
Qty: 90 TABLET | Refills: 1 | Status: SHIPPED | OUTPATIENT
Start: 2024-04-18

## 2024-04-18 RX ORDER — AMLODIPINE BESYLATE 5 MG/1
5 TABLET ORAL DAILY
Qty: 30 TABLET | Refills: 1 | Status: SHIPPED | OUTPATIENT
Start: 2024-04-18

## 2024-04-18 RX ORDER — LANCETS
EACH MISCELLANEOUS
Qty: 200 EACH | Refills: 1 | Status: SHIPPED | OUTPATIENT
Start: 2024-04-18

## 2024-04-18 ASSESSMENT — PATIENT HEALTH QUESTIONNAIRE - PHQ9
1. LITTLE INTEREST OR PLEASURE IN DOING THINGS: NOT AT ALL
SUM OF ALL RESPONSES TO PHQ9 QUESTIONS 1 AND 2: 0
2. FEELING DOWN, DEPRESSED OR HOPELESS: NOT AT ALL

## 2024-04-18 NOTE — PROGRESS NOTES
"Subjective   Patient ID: Cooper Gibbons \"Alirio\" is a 59 y.o. male who presents for Hypertension, GERD, Hyperlipidemia, and Diabetes.  Today he is accompanied by alone.     HPI  Currently works for [employer]    1. Hypertension  The patient remains on losartan 100 mg daily as prescribed.   Recent home blood pressure readings have consistently been elevated.   However, the patient denies experiencing any cardiopulmonary symptoms.   Adjustment of medication and refills are requested for the mail-out pharmacy, with a willingness to consider dosage escalation if deemed necessary.  Kidney function: cr 1.72, eGFR 45, Urea Nitrogen 26    2. Diabetes Mellitus with CKD Stage 3  The patient continues to adhere to Ozempic as per the treatment plan.   Hemoglobin A1c levels is stable, with the latest measurement at 6.8% as of April 2024.  But, this did increase from previous  Refill requests are made for delivery to the mail-out pharmacy.    3. GERD  Omeprazole 20 mg is being taken daily as directed.   The patient reports significant relief from symptoms associated with acid reflux and indicates that refills are not currently required, utilizing the medication on an as-needed basis.    4. Hyperlipidemia  The patient is maintaining atorvastatin 40 mg daily as prescribed, with cholesterol levels showing stability.   There are no complaints of myalgias. Refills are requested for the mail-out pharmacy.   Additionally, the patient has not yet undergone a CT cardiac score assessment.          Current Outpatient Medications on File Prior to Visit   Medication Sig Dispense Refill    multivitamin with minerals tablet Take 1 tablet by mouth once daily.      needles, disposable (BD INTRADERMAL BEVEL NEEDLES MISC) Micro 32Gx 6mm; use as directed for once daily injection      NON FORMULARY Alcohol swabs: use as directed      [DISCONTINUED] Accu-Chek Fastclix Lancet Drum misc USE 1 TO CHECK BLOOD SUGAR TWICE DAILY 200 each 1    " "[DISCONTINUED] Accu-Chek Guide test strips strip USE TO TEST TWICE DAILY ,  MORNING FASTING AND 1 TO 2 HOURS AFTER LARGEST MEAL 200 strip 1    [DISCONTINUED] atorvastatin (Lipitor) 40 mg tablet Take 1 tablet (40 mg) by mouth once daily. 90 tablet 1    [DISCONTINUED] losartan (Cozaar) 100 mg tablet Take 1 tablet (100 mg) by mouth once daily. 90 tablet 1    [DISCONTINUED] omeprazole (PriLOSEC) 20 mg DR capsule Take 1 capsule (20 mg) by mouth once daily in the morning. Take before meals. 90 capsule 1    [DISCONTINUED] semaglutide 2 mg/dose (8 mg/3 mL) pen injector Inject 2 mg under the skin 1 (one) time per week. 9 mL 1     No current facility-administered medications on file prior to visit.        No Known Allergies    Immunization History   Administered Date(s) Administered    Flu vaccine (IIV4), preservative free *Check age/dose* 11/10/2023    Hep A, Unspecified 11/22/2013    Hepatitis B vaccine, adult (RECOMBIVAX, ENGERIX) 11/22/2013, 01/15/2014, 05/06/2014    Influenza, seasonal, injectable 10/26/2022    Pfizer Gray Cap SARS-CoV-2 04/09/2022    Pfizer Purple Cap SARS-CoV-2 05/01/2021, 05/22/2021    Pneumococcal conjugate vaccine, 20-valent (PREVNAR 20) 11/10/2023    Pneumococcal polysaccharide vaccine, 23-valent, age 2 years and older (PNEUMOVAX 23) 09/01/2021    SARS-CoV-2, Unspecified 04/09/2022    Tdap vaccine, age 7 year and older (BOOSTRIX, ADACEL) 08/19/2011, 06/05/2022    Zoster vaccine, recombinant, adult (SHINGRIX) 10/26/2022, 03/23/2023         Review of Systems  All pertinent positive symptoms are included in the history of present illness.  All other systems have been reviewed and are negative and noncontributory to this patient's current ailments.     Objective   /82 (BP Location: Left arm, Patient Position: Sitting, BP Cuff Size: Large adult)   Pulse 90   Ht 1.905 m (6' 3\")   Wt 132 kg (291 lb)   SpO2 99%   BMI 36.37 kg/m²   BSA: 2.64 meters squared  Lab on 04/06/2024   Component Date " Value Ref Range Status    Cholesterol 04/06/2024 144  0 - 199 mg/dL Final          Age      Desirable   Borderline High   High     0-19 Y     0 - 169       170 - 199     >/= 200    20-24 Y     0 - 189       190 - 224     >/= 225         >24 Y     0 - 199       200 - 239     >/= 240   **All ranges are based on fasting samples. Specific   therapeutic targets will vary based on patient-specific   cardiac risk.    Pediatric guidelines reference:Pediatrics 2011, 128(S5).Adult guidelines reference: NCEP ATPIII Guidelines,ARLETTE 2001, 258:2486-97    Venipuncture immediately after or during the administration of Metamizole may lead to falsely low results. Testing should be performed immediately prior to Metamizole dosing.    HDL-Cholesterol 04/06/2024 39.4  mg/dL Final      Age       Very Low   Low     Normal    High    0-19 Y    < 35      < 40     40-45     ----  20-24 Y    ----     < 40      >45      ----        >24 Y      ----     < 40     40-60      >60      Cholesterol/HDL Ratio 04/06/2024 3.7   Final      Ref Values  Desirable  < 3.4  High Risk  > 5.0    LDL Calculated 04/06/2024 77  <=99 mg/dL Final                                Near   Borderline      AGE      Desirable  Optimal    High     High     Very High     0-19 Y     0 - 109     ---    110-129   >/= 130     ----    20-24 Y     0 - 119     ---    120-159   >/= 160     ----      >24 Y     0 -  99   100-129  130-159   160-189     >/=190      VLDL 04/06/2024 27  0 - 40 mg/dL Final    Triglycerides 04/06/2024 136  0 - 149 mg/dL Final       Age         Desirable   Borderline High   High     Very High   0 D-90 D    19 - 174         ----         ----        ----  91 D- 9 Y     0 -  74        75 -  99     >/= 100      ----    10-19 Y     0 -  89        90 - 129     >/= 130      ----    20-24 Y     0 - 114       115 - 149     >/= 150      ----         >24 Y     0 - 149       150 - 199    200- 499    >/= 500    Venipuncture immediately after or during the administration  of Metamizole may lead to falsely low results. Testing should be performed immediately prior to Metamizole dosing.    Non HDL Cholesterol 04/06/2024 105  0 - 149 mg/dL Final          Age       Desirable   Borderline High   High     Very High     0-19 Y     0 - 119       120 - 144     >/= 145    >/= 160    20-24 Y     0 - 149       150 - 189     >/= 190      ----         >24 Y    30 mg/dL above LDL Cholesterol goal      Glucose 04/06/2024 136 (H)  74 - 99 mg/dL Final    Sodium 04/06/2024 141  136 - 145 mmol/L Final    Potassium 04/06/2024 5.0  3.5 - 5.3 mmol/L Final    Chloride 04/06/2024 105  98 - 107 mmol/L Final    Bicarbonate 04/06/2024 26  21 - 32 mmol/L Final    Anion Gap 04/06/2024 15  10 - 20 mmol/L Final    Urea Nitrogen 04/06/2024 26 (H)  6 - 23 mg/dL Final    Creatinine 04/06/2024 1.72 (H)  0.50 - 1.30 mg/dL Final    eGFR 04/06/2024 45 (L)  >60 mL/min/1.73m*2 Final    Calculations of estimated GFR are performed using the 2021 CKD-EPI Study Refit equation without the race variable for the IDMS-Traceable creatinine methods.  https://jasn.asnjournals.org/content/early/2021/09/22/ASN.6505443416    Calcium 04/06/2024 9.9  8.6 - 10.6 mg/dL Final    Albumin 04/06/2024 4.3  3.4 - 5.0 g/dL Final    Alkaline Phosphatase 04/06/2024 103  33 - 120 U/L Final    Total Protein 04/06/2024 6.9  6.4 - 8.2 g/dL Final    AST 04/06/2024 15  9 - 39 U/L Final    Bilirubin, Total 04/06/2024 0.8  0.0 - 1.2 mg/dL Final    ALT 04/06/2024 27  10 - 52 U/L Final    Patients treated with Sulfasalazine may generate falsely decreased results for ALT.    Hemoglobin A1C 04/06/2024 6.8 (H)  see below % Final    Estimated Average Glucose 04/06/2024 148  Not Established mg/dL Final       Physical Exam  CONSTITUTIONAL - well nourished, well developed, looks like stated age, in no acute distress, not ill-appearing, and not tired appearing  SKIN - normal skin color and pigmentation, normal skin turgor without rash, lesions, or nodules  visualized  HEAD - no trauma, normocephalic  EYES - normal external exam  ENT - TM's intact, no injection, no signs of infection, uvula midline, normal tongue movement and throat normal, no exudate, nasal passage without discharge and patent  NECK - supple without rigidity, no neck mass was observed, no thyromegaly or thyroid nodules  CHEST - clear to auscultation, no wheezing, no crackles and no rales, good effort  CARDIAC - regular rate and regular rhythm, no skipped beats, no murmur  ABDOMEN - no organomegaly, soft, nontender, nondistended, normal bowel sounds  EXTREMITIES - no edema, no deformities  NEUROLOGICAL - normal gait, normal balance, normal motor, no ataxia  PSYCHIATRIC - alert, pleasant and cordial, age-appropriate  IMMUNOLOGIC - no cervical lymphadenopathy     Assessment/Plan     1. Hypertension  The patient's blood pressure has been observed to be elevated.   A prescription of amlodipine 5 mg was sent to be taken alongside losartan 100 mg   I would like to have you monitor and record blood pressures at home  Blood pressure goal should be below 130/80, ideally 120/80    2. Diabetes Mellitus with CKD Stage 3  Continue with the current regimen of Ozempic as prescribed.   Refill has been arranged to be sent to the mail-out pharmacy.  Also, I have ordered a BMP to continue monitoring your kidney function  Please have this done prior to your next appointment    3. GERD  The patient's condition remains stable, and no changes to the current treatment plan are recommended.   Continue with omeprazole 20 mg.   Kindly inform us when refills are required.    4. Hyperlipidemia  Continue with atorvastatin 40 mg daily without alterations.   Laboratory results indicate good and stable outcomes.   Refill has been arranged to be sent to the mail-out pharmacy.   Additionally, a CT cardiac score orders still valid so please get this done at your earliest major convenience    As stated above, I would like you to follow-up  within the next 30-60 days  At that time we will discuss efficacy/tolerability of the amlodipine alongside losartan as well as follow-up with your repeated lab work  If kidney function continues to decrease, we will then have to discuss a nephrology referral    Thank you for entrusting us with your healthcare needs.   Should you have any questions or concerns, please do not hesitate to contact our office.

## 2024-05-10 ENCOUNTER — HOSPITAL ENCOUNTER (OUTPATIENT)
Dept: RADIOLOGY | Facility: HOSPITAL | Age: 59
Discharge: HOME | End: 2024-05-10
Payer: COMMERCIAL

## 2024-05-10 DIAGNOSIS — E78.5 HYPERLIPIDEMIA, UNSPECIFIED HYPERLIPIDEMIA TYPE: ICD-10-CM

## 2024-05-10 PROCEDURE — 75571 CT HRT W/O DYE W/CA TEST: CPT

## 2024-05-13 NOTE — RESULT ENCOUNTER NOTE
CT cardiac score showed a value of 0 which places the patient in the lowest risk stratification for any cardiovascular disease    We will continue monitoring cholesterol closely

## 2024-05-17 ENCOUNTER — LAB (OUTPATIENT)
Dept: LAB | Facility: LAB | Age: 59
End: 2024-05-17
Payer: COMMERCIAL

## 2024-05-17 DIAGNOSIS — E66.01 SEVERE OBESITY (BMI 35.0-39.9) WITH COMORBIDITY (MULTI): ICD-10-CM

## 2024-05-17 DIAGNOSIS — E78.5 HYPERLIPIDEMIA, UNSPECIFIED HYPERLIPIDEMIA TYPE: ICD-10-CM

## 2024-05-17 DIAGNOSIS — E11.22 TYPE 2 DM WITH CKD STAGE 3 AND HYPERTENSION (MULTI): ICD-10-CM

## 2024-05-17 DIAGNOSIS — K21.9 LARYNGOPHARYNGEAL REFLUX: ICD-10-CM

## 2024-05-17 DIAGNOSIS — N18.30 TYPE 2 DM WITH CKD STAGE 3 AND HYPERTENSION (MULTI): ICD-10-CM

## 2024-05-17 DIAGNOSIS — I12.9 TYPE 2 DM WITH CKD STAGE 3 AND HYPERTENSION (MULTI): ICD-10-CM

## 2024-05-17 DIAGNOSIS — I10 PRIMARY HYPERTENSION: ICD-10-CM

## 2024-05-17 LAB
ANION GAP SERPL CALC-SCNC: 13 MMOL/L (ref 10–20)
BUN SERPL-MCNC: 29 MG/DL (ref 6–23)
CALCIUM SERPL-MCNC: 9.2 MG/DL (ref 8.6–10.6)
CHLORIDE SERPL-SCNC: 107 MMOL/L (ref 98–107)
CO2 SERPL-SCNC: 26 MMOL/L (ref 21–32)
CREAT SERPL-MCNC: 1.67 MG/DL (ref 0.5–1.3)
EGFRCR SERPLBLD CKD-EPI 2021: 47 ML/MIN/1.73M*2
GLUCOSE SERPL-MCNC: 147 MG/DL (ref 74–99)
POTASSIUM SERPL-SCNC: 4.2 MMOL/L (ref 3.5–5.3)
SODIUM SERPL-SCNC: 142 MMOL/L (ref 136–145)

## 2024-05-17 PROCEDURE — 80048 BASIC METABOLIC PNL TOTAL CA: CPT

## 2024-05-17 PROCEDURE — 36415 COLL VENOUS BLD VENIPUNCTURE: CPT

## 2024-05-19 NOTE — RESULT ENCOUNTER NOTE
Kidney function continues to show a slight decrease at 47% with the previous being at 45%    BUN/creatinine still slightly elevated but almost identical as previous    Has the patient been feeling well?  Due to his history of diabetes and with this lower kidney function, I feel that we should consider following up with a nephrologist    Please let me know and we can easily arrange this

## 2024-06-14 DIAGNOSIS — I10 PRIMARY HYPERTENSION: ICD-10-CM

## 2024-06-14 RX ORDER — AMLODIPINE BESYLATE 5 MG/1
5 TABLET ORAL DAILY
Qty: 90 TABLET | Refills: 0 | Status: SHIPPED | OUTPATIENT
Start: 2024-06-14 | End: 2024-09-12

## 2024-06-28 ENCOUNTER — APPOINTMENT (OUTPATIENT)
Dept: PRIMARY CARE | Facility: CLINIC | Age: 59
End: 2024-06-28
Payer: COMMERCIAL

## 2024-06-28 VITALS
WEIGHT: 285 LBS | HEIGHT: 75 IN | HEART RATE: 66 BPM | BODY MASS INDEX: 35.43 KG/M2 | DIASTOLIC BLOOD PRESSURE: 80 MMHG | SYSTOLIC BLOOD PRESSURE: 124 MMHG

## 2024-06-28 DIAGNOSIS — N18.30 STAGE 3 CHRONIC KIDNEY DISEASE, UNSPECIFIED WHETHER STAGE 3A OR 3B CKD (MULTI): ICD-10-CM

## 2024-06-28 DIAGNOSIS — E78.5 HYPERLIPIDEMIA, UNSPECIFIED HYPERLIPIDEMIA TYPE: ICD-10-CM

## 2024-06-28 DIAGNOSIS — N18.30 TYPE 2 DM WITH CKD STAGE 3 AND HYPERTENSION (MULTI): Primary | ICD-10-CM

## 2024-06-28 DIAGNOSIS — E66.01 SEVERE OBESITY (BMI 35.0-39.9) WITH COMORBIDITY (MULTI): ICD-10-CM

## 2024-06-28 DIAGNOSIS — I12.9 TYPE 2 DM WITH CKD STAGE 3 AND HYPERTENSION (MULTI): Primary | ICD-10-CM

## 2024-06-28 DIAGNOSIS — I10 PRIMARY HYPERTENSION: ICD-10-CM

## 2024-06-28 DIAGNOSIS — E11.22 TYPE 2 DM WITH CKD STAGE 3 AND HYPERTENSION (MULTI): Primary | ICD-10-CM

## 2024-06-28 PROCEDURE — 3048F LDL-C <100 MG/DL: CPT | Performed by: STUDENT IN AN ORGANIZED HEALTH CARE EDUCATION/TRAINING PROGRAM

## 2024-06-28 PROCEDURE — 1036F TOBACCO NON-USER: CPT | Performed by: STUDENT IN AN ORGANIZED HEALTH CARE EDUCATION/TRAINING PROGRAM

## 2024-06-28 PROCEDURE — 3079F DIAST BP 80-89 MM HG: CPT | Performed by: STUDENT IN AN ORGANIZED HEALTH CARE EDUCATION/TRAINING PROGRAM

## 2024-06-28 PROCEDURE — 3074F SYST BP LT 130 MM HG: CPT | Performed by: STUDENT IN AN ORGANIZED HEALTH CARE EDUCATION/TRAINING PROGRAM

## 2024-06-28 PROCEDURE — 4010F ACE/ARB THERAPY RXD/TAKEN: CPT | Performed by: STUDENT IN AN ORGANIZED HEALTH CARE EDUCATION/TRAINING PROGRAM

## 2024-06-28 PROCEDURE — 99214 OFFICE O/P EST MOD 30 MIN: CPT | Performed by: STUDENT IN AN ORGANIZED HEALTH CARE EDUCATION/TRAINING PROGRAM

## 2024-06-28 PROCEDURE — 3044F HG A1C LEVEL LT 7.0%: CPT | Performed by: STUDENT IN AN ORGANIZED HEALTH CARE EDUCATION/TRAINING PROGRAM

## 2024-06-28 RX ORDER — AMLODIPINE BESYLATE 5 MG/1
5 TABLET ORAL DAILY
Qty: 90 TABLET | Refills: 0 | Status: SHIPPED | OUTPATIENT
Start: 2024-06-28 | End: 2024-09-26

## 2024-06-28 NOTE — PROGRESS NOTES
"Subjective   Patient ID: Cooper Gibbons \"Alirio\" is a 59 y.o. male who presents for review bloodwork.  Today he is accompanied by alone.     HPI  Currently works for [employer]    1. Hypertension  Continues to take losartan 100 mg alongside amlodipine 5 mg as indicated the chart  At his last visit his blood pressure was not fully well-controlled so we added on amlodipine as an ache in the chart  Today his blood pressure is well within normal limits at 124/80  Blood pressures at home have been also consistently within normal limits  He did note occasionally some lightheadedness when he was working outside but states that he most likely was not drinking of water  Otherwise, feels very well with this medication and is happy that he is within normal limits  Requesting refills    2. Diabetes Mellitus with CKD Stage 3  The patient continues to adhere to Ozempic as per the treatment plan.   Hemoglobin A1c levels is stable, with the latest measurement at 6.8% as of April 2024.  He does feel well overall since now he is down 50 pounds  Continues to be active  Also noted that he does have a nephrology appointment in September of this year    3. GERD  Omeprazole 20 mg is continued but only on a rarely/as needed basis  Continues to notice significant relief and as stated above only uses this rarely    4. Hyperlipidemia  The patient is maintaining atorvastatin 40 mg daily as prescribed, with cholesterol levels showing stability.   Ultimately did his CT cardiac score in May of this year  It showed a value of 0  Patient very happy to hear this this value and wishes to discuss this briefly          Current Outpatient Medications on File Prior to Visit   Medication Sig Dispense Refill    atorvastatin (Lipitor) 40 mg tablet Take 1 tablet (40 mg) by mouth once daily. 90 tablet 1    blood sugar diagnostic (Accu-Chek Guide test strips) strip USE TO TEST TWICE DAILY ,  MORNING FASTING AND 1 TO 2 HOURS AFTER LARGEST MEAL 200 " "strip 1    lancets (Accu-Chek Fastclix Lancet Drum) [facility] USE 1 TO CHECK BLOOD SUGAR TWICE DAILY 200 each 1    losartan (Cozaar) 100 mg tablet Take 1 tablet (100 mg) by mouth once daily. 90 tablet 1    multivitamin with minerals tablet Take 1 tablet by mouth once daily.      needles, disposable (BD INTRADERMAL BEVEL NEEDLES MISC) Micro 32Gx 6mm; use as directed for once daily injection      NON FORMULARY Alcohol swabs: use as directed      omeprazole (PriLOSEC) 20 mg DR capsule Take 1 capsule (20 mg) by mouth once daily in the morning. Take before meals. 90 capsule 1    semaglutide 2 mg/dose (8 mg/3 mL) pen injector Inject 2 mg under the skin 1 (one) time per week. 9 mL 1    [DISCONTINUED] amLODIPine (Norvasc) 5 mg tablet Take 1 tablet (5 mg) by mouth once daily. 90 tablet 0     No current facility-administered medications on file prior to visit.        No Known Allergies    Immunization History   Administered Date(s) Administered    Flu vaccine (IIV4), preservative free *Check age/dose* 11/10/2023    Hep A, Unspecified 11/22/2013    Hepatitis B vaccine, adult *Check Product/Dose* 11/22/2013, 01/15/2014, 05/06/2014    Influenza, seasonal, injectable 10/26/2022    Pfizer Gray Cap SARS-CoV-2 04/09/2022    Pfizer Purple Cap SARS-CoV-2 05/01/2021, 05/22/2021    Pneumococcal conjugate vaccine, 20-valent (PREVNAR 20) 11/10/2023    Pneumococcal polysaccharide vaccine, 23-valent, age 2 years and older (PNEUMOVAX 23) 09/01/2021    SARS-CoV-2, Unspecified 04/09/2022    Tdap vaccine, age 7 year and older (BOOSTRIX, ADACEL) 08/19/2011, 06/05/2022    Zoster vaccine, recombinant, adult (SHINGRIX) 10/26/2022, 03/23/2023         Review of Systems  All pertinent positive symptoms are included in the history of present illness.  All other systems have been reviewed and are negative and noncontributory to this patient's current ailments.     Objective   /80   Pulse 66   Ht 1.905 m (6' 3\")   Wt 129 kg (285 lb)   BMI 35.62 " kg/m²   BSA: 2.61 meters squared  No visits with results within 1 Month(s) from this visit.   Latest known visit with results is:   Lab on 05/17/2024   Component Date Value Ref Range Status    Glucose 05/17/2024 147 (H)  74 - 99 mg/dL Final    Sodium 05/17/2024 142  136 - 145 mmol/L Final    Potassium 05/17/2024 4.2  3.5 - 5.3 mmol/L Final    Chloride 05/17/2024 107  98 - 107 mmol/L Final    Bicarbonate 05/17/2024 26  21 - 32 mmol/L Final    Anion Gap 05/17/2024 13  10 - 20 mmol/L Final    Urea Nitrogen 05/17/2024 29 (H)  6 - 23 mg/dL Final    Creatinine 05/17/2024 1.67 (H)  0.50 - 1.30 mg/dL Final    eGFR 05/17/2024 47 (L)  >60 mL/min/1.73m*2 Final    Calculations of estimated GFR are performed using the 2021 CKD-EPI Study Refit equation without the race variable for the IDMS-Traceable creatinine methods.  https://jasn.asnjournals.org/content/early/2021/09/22/ASN.5756442297    Calcium 05/17/2024 9.2  8.6 - 10.6 mg/dL Final       Physical Exam  CONSTITUTIONAL - well nourished, well developed, looks like stated age, in no acute distress, not ill-appearing, and not tired appearing  SKIN - normal skin color and pigmentation, normal skin turgor without rash, lesions, or nodules visualized  HEAD - no trauma, normocephalic  EYES - normal external exam  CHEST -no distressed breathing, good effort, heart regular in rhythm  EXTREMITIES - no edema, no deformities  NEUROLOGICAL - normal balance, normal motor, no ataxia  PSYCHIATRIC - alert, pleasant and cordial, age-appropriate    Assessment/Plan     1. Hypertension  Blood pressure looks fantastic today  Continue amlodipine 5 mg alongside losartan 100 mg  I would like to have you monitor and record blood pressures at home  Blood pressure goal should be below 130/80, ideally 120/80    2. Diabetes Mellitus with CKD Stage 3  Continue with the current regimen of Ozempic as prescribed.   Please follow-up with the nephrologist at your scheduled appointment  Otherwise, we will  continue close follow-up and have you come back into the office in the fall of this year    3. GERD  Continue omeprazole on an as-needed basis  Let us know if/when refills are needed    4. Hyperlipidemia  Continue with atorvastatin 40 mg daily without any changes recommended  Cholesterol panel noted stability from earlier this year  CT cardiac score discussed briefly and noted a value of 0  We will continue monitoring closely      Thank you for entrusting us with your healthcare needs.   Should you have any questions or concerns, please do not hesitate to contact our office.  Otherwise, please follow-up in October/November for your physical

## 2024-09-02 DIAGNOSIS — K21.9 LARYNGOPHARYNGEAL REFLUX: ICD-10-CM

## 2024-09-02 DIAGNOSIS — E11.22 TYPE 2 DM WITH CKD STAGE 3 AND HYPERTENSION (MULTI): ICD-10-CM

## 2024-09-02 DIAGNOSIS — I10 PRIMARY HYPERTENSION: ICD-10-CM

## 2024-09-02 DIAGNOSIS — I12.9 TYPE 2 DM WITH CKD STAGE 3 AND HYPERTENSION (MULTI): ICD-10-CM

## 2024-09-02 DIAGNOSIS — E78.5 HYPERLIPIDEMIA, UNSPECIFIED HYPERLIPIDEMIA TYPE: ICD-10-CM

## 2024-09-02 DIAGNOSIS — N18.30 TYPE 2 DM WITH CKD STAGE 3 AND HYPERTENSION (MULTI): ICD-10-CM

## 2024-09-03 RX ORDER — ATORVASTATIN CALCIUM 40 MG/1
40 TABLET, FILM COATED ORAL DAILY
Qty: 90 TABLET | Refills: 0 | Status: SHIPPED | OUTPATIENT
Start: 2024-09-03

## 2024-09-03 RX ORDER — LOSARTAN POTASSIUM 100 MG/1
100 TABLET ORAL DAILY
Qty: 90 TABLET | Refills: 0 | Status: SHIPPED | OUTPATIENT
Start: 2024-09-03

## 2024-09-03 RX ORDER — OMEPRAZOLE 20 MG/1
CAPSULE, DELAYED RELEASE ORAL
Qty: 90 CAPSULE | Refills: 0 | Status: SHIPPED | OUTPATIENT
Start: 2024-09-03

## 2024-09-18 ENCOUNTER — APPOINTMENT (OUTPATIENT)
Dept: NEPHROLOGY | Facility: CLINIC | Age: 59
End: 2024-09-18
Payer: COMMERCIAL

## 2024-09-18 VITALS
WEIGHT: 285 LBS | HEIGHT: 75 IN | OXYGEN SATURATION: 98 % | BODY MASS INDEX: 35.43 KG/M2 | SYSTOLIC BLOOD PRESSURE: 143 MMHG | HEART RATE: 76 BPM | TEMPERATURE: 98.6 F | DIASTOLIC BLOOD PRESSURE: 87 MMHG | RESPIRATION RATE: 16 BRPM

## 2024-09-18 DIAGNOSIS — N18.30 TYPE 2 DM WITH CKD STAGE 3 AND HYPERTENSION (MULTI): Primary | ICD-10-CM

## 2024-09-18 DIAGNOSIS — I10 ESSENTIAL HYPERTENSION: ICD-10-CM

## 2024-09-18 DIAGNOSIS — I12.9 TYPE 2 DM WITH CKD STAGE 3 AND HYPERTENSION (MULTI): Primary | ICD-10-CM

## 2024-09-18 DIAGNOSIS — E11.22 TYPE 2 DM WITH CKD STAGE 3 AND HYPERTENSION (MULTI): Primary | ICD-10-CM

## 2024-09-18 DIAGNOSIS — N18.30 STAGE 3 CHRONIC KIDNEY DISEASE, UNSPECIFIED WHETHER STAGE 3A OR 3B CKD (MULTI): ICD-10-CM

## 2024-09-18 LAB
CREAT UR-MCNC: 107.9 MG/DL (ref 20–370)
POC APPEARANCE, URINE: ABNORMAL
POC BILIRUBIN, URINE: NEGATIVE
POC BLOOD, URINE: NEGATIVE
POC COLOR, URINE: YELLOW
POC GLUCOSE, URINE: NEGATIVE MG/DL
POC KETONES, URINE: NEGATIVE MG/DL
POC LEUKOCYTES, URINE: NEGATIVE
POC NITRITE,URINE: NEGATIVE
POC PH, URINE: 6 PH
POC PROTEIN, URINE: NEGATIVE MG/DL
POC SPECIFIC GRAVITY, URINE: 1.01
POC UROBILINOGEN, URINE: 0.2 EU/DL
PROT UR-ACNC: 8 MG/DL (ref 5–25)
PROT/CREAT UR: 0.07 MG/MG CREAT (ref 0–0.17)

## 2024-09-18 PROCEDURE — 81003 URINALYSIS AUTO W/O SCOPE: CPT | Performed by: INTERNAL MEDICINE

## 2024-09-18 PROCEDURE — 3008F BODY MASS INDEX DOCD: CPT | Performed by: INTERNAL MEDICINE

## 2024-09-18 PROCEDURE — 3048F LDL-C <100 MG/DL: CPT | Performed by: INTERNAL MEDICINE

## 2024-09-18 PROCEDURE — 99205 OFFICE O/P NEW HI 60 MIN: CPT | Performed by: INTERNAL MEDICINE

## 2024-09-18 PROCEDURE — 4010F ACE/ARB THERAPY RXD/TAKEN: CPT | Performed by: INTERNAL MEDICINE

## 2024-09-18 PROCEDURE — 3077F SYST BP >= 140 MM HG: CPT | Performed by: INTERNAL MEDICINE

## 2024-09-18 PROCEDURE — 84156 ASSAY OF PROTEIN URINE: CPT

## 2024-09-18 PROCEDURE — 82570 ASSAY OF URINE CREATININE: CPT

## 2024-09-18 PROCEDURE — 3061F NEG MICROALBUMINURIA REV: CPT | Performed by: INTERNAL MEDICINE

## 2024-09-18 PROCEDURE — 3079F DIAST BP 80-89 MM HG: CPT | Performed by: INTERNAL MEDICINE

## 2024-09-18 PROCEDURE — 82043 UR ALBUMIN QUANTITATIVE: CPT

## 2024-09-18 PROCEDURE — 3044F HG A1C LEVEL LT 7.0%: CPT | Performed by: INTERNAL MEDICINE

## 2024-09-18 NOTE — PROGRESS NOTES
"Subjective       Cooper Gibbons \"Alirio\" is a 59 y.o. male who has past medical history of significant NSAID use in the past due to arthritis, hypertension, type 2 diabetes was coming to see me today initial consultation for CKD management per PCP    Alirio came alone today.  He admits significant NSAID use in the past (more than 2400 mg of NSAIDs daily for years).  He developed severe acute kidney injury in April 2018 that would secondary to dehydration.  Patient had GFR as low as 11 at that time-did not need dialysis.  He Parcher recovered and GFR is currently stable routine 45-50%.  He was evaluated by nephrology in the past for kidney size discrepancy.  Kidney Doppler was unremarkable (limited exam).  He had MRI in 2023 that showed patent results bilateral.  He lost follow-up with nephrology    Today, no kidney related complaints or concerns.  He is aware of history of chronic kidney disease.  Well-controlled hypertension and diabetes.  He is active and has big body built.  No lower urinary tract symptoms.  Most recent blood work done was reviewed with him and showed stable serum creatinine 1.4-1.7 GFR 45-50.  Urine dipstick today with no albuminuria.  Blood pressure is accepted.  I had long discussion with Alirio today regarding CKD.  No red flags for progression.  Will follow conservative measures.  Will continue RAAS inhibitors, blood pressure control and diabetes control.  Will follow-up closely      Objective   Wt 129 kg (285 lb)   BMI 35.62 kg/m²   Wt Readings from Last 3 Encounters:   09/18/24 129 kg (285 lb)   06/28/24 129 kg (285 lb)   04/18/24 132 kg (291 lb)       Physical Exam    General appearance: no distress awake and alert on room air, euvolemic on exam, big stature  Eyes: non-icteric  HEENT: atrumatic head, PEERLA, moist mucosa  Skin: no apparent rash  Heart: NSR, S1, S2 normal, no murmur or gallop  Lungs: Symmetrical expansion,CTA bilat no wheezing/crackles  Abdomen: soft, nt/nd, " "obese  Extremities: no edema bilat  Neuro: No FND,asterixis, no focal deficits noticed        Review of Systems     Constitutional: no fever, no chills, no recent weight gain and no recent weight loss.   Eyes: no blurred vision and no diplopia.   ENT: no hearing loss, no earache, no sore throat, no swollen glands in the neck and no nasal discharge.   Cardiovascular: no chest pain, no palpitations and no lower extremity edema.   Respiratory: no shortness of breath, no chronic cough and no shortness of breath during exertion.   Gastrointestinal: no abdominal pain, no constipation, no heartburn, no vomiting, no bloody stools and no change in bowel movements.   Genitourinary: no dysuria and no hematuria.   Musculoskeletal: no arthralgias and no myalgias.   Skin: no rashes and no skin lesions.   Neurological: no headaches and no dizziness.   Psychiatric: no confusion, no depression and no anxiety.   Endocrine: no heat intolerance, no cold intolerance, appetite not increased, no thyroid disorder, no increased urinary frequency and no dry skin.   Hematologic/Lymphatic: does not bleed easily and does not bruise easily.   All other systems have been reviewed and are negative for complaint.         Data Review                   Lab Results   Component Value Date    URICACID 5.1 04/18/2020           Lab Results   Component Value Date    HGBA1C 6.8 (H) 04/06/2024                 No lab exists for component: \"CR\", \"PHOSPHORUS\"        Albumin/Creatine Ratio   Date Value Ref Range Status   01/28/2023 SEE COMMENT 0.0 - 30.0 ug/mg crt Final     Comment:     One or more analytes used in this calculation   is outside of the analytical measurement range.  Calculation cannot be performed.     02/16/2022 SEE COMMENT 0.0 - 30.0 ug/mg crt Final     Comment:     One or more analytes used in this calculation   is outside of the analytical measurement range.  Calculation cannot be performed.     09/03/2021 6.4 0.0 - 30.0 ug/mg crt Final "            RFP  Recent Labs     05/17/24  1041 04/06/24  1010 01/28/23  0947 02/16/22  0840 09/03/21  0948 10/26/20  1643 08/01/20  0812 08/01/20  0811 08/17/19  0930 11/05/18  0745 10/27/18  1136 05/08/18  1615 04/23/18  1532 04/18/18  0824 04/16/18  0619 04/15/18  1558 04/15/18  0545    141 140 142 142 142  --  142   < > 141   < > 139 143 144   < > 137 140   K 4.2 5.0 4.1 4.8 4.6 4.4  --  4.8   < > 4.5   < > 4.1 4.9 4.3   < > 4.0 4.1    105 105 107 106 107  --  107   < > 105   < > 103 108* 108*   < > 105 108*   CO2 26 26 28 26 30 27  --  27   < > 26   < > 26 26 26   < > 25 22   BUN 29* 26* 17 22 25* 18  --  25*   < > 19   < > 23 21 27*   < > 37* 34*   CREATININE 1.67* 1.72* 1.48* 1.44* 1.62* 1.56*  --  1.38*   < > 1.36*   < > 1.51* 2.10* 2.76*   < > 4.40* 4.17*   GLUCOSE 147* 136* 123* 102* 111* 83  --  109*   < > 128*   < > 95 122* 135*   < > 110* 104*   CALCIUM 9.2 9.9 9.3 9.4 9.5 9.8  --  9.4   < > 9.8   < > 9.7 9.6 9.1   < > 8.4* 8.4*   PHOS  --   --   --   --   --   --  3.4  --   --  2.8  --  4.2 3.6 3.4  --  4.2 4.3   EGFR 47* 45*  --   --   --   --   --   --   --   --   --   --   --   --   --   --   --    ANIONGAP 13 15 11 14 11 12  --  13   < > 15   < > 14 14 14   < > 11 14    < > = values in this interval not displayed.        Urineanalysis  Recent Labs     02/16/22  0840 08/01/20  0812 10/27/18  1136 04/14/18  1745   COLORU YELLOW  --  STRAW YELLOW   APPEARANCEU CLEAR  --  CLEAR CLEAR   SPECGRAVU 1.014  --  1.011 1.006   JESSICA 5.0  --  6.0 5.0   PROTUR NEGATIVE 6 NEGATIVE 30 (1+)*   GLUCOSEU NEGATIVE  --  NEGATIVE NEGATIVE   BLOODU NEGATIVE  --  NEGATIVE NEGATIVE   KETONESU NEGATIVE  --  NEGATIVE NEGATIVE   BILIRUBINU NEGATIVE  --  NEGATIVE NEGATIVE   NITRITEU NEGATIVE  --  NEGATIVE NEGATIVE   LEUKOCYTESU NEGATIVE  --  NEGATIVE NEGATIVE       Urine Electrolytes  Recent Labs     01/28/23  0947 02/16/22  0840 09/03/21  0948 08/01/20  0812 10/27/18  1136 04/14/18  1745   CREATU 127.0 112.0 174.0  "106.0  --   --    PROTUR  --  NEGATIVE  --  6 NEGATIVE 30 (1+)*   MICROALBCREA SEE COMMENT SEE COMMENT 6.4 SEE COMMENT  --   --         Urine Micro  Recent Labs     04/14/18  1745   WBCU 0-5   RBCU 0-5   HYALCASTU OCCASIONAL*   BACTERIAU 1+*   MUCUSU 2+        Iron  No results for input(s): \"IRON\", \"TIBC\", \"IRONSAT\", \"FERRITIN\" in the last 10355 hours.       Current Outpatient Medications on File Prior to Visit   Medication Sig Dispense Refill    amLODIPine (Norvasc) 5 mg tablet Take 1 tablet (5 mg) by mouth once daily. 90 tablet 0    atorvastatin (Lipitor) 40 mg tablet TAKE 1 TABLET BY MOUTH ONCE  DAILY 90 tablet 0    blood sugar diagnostic (Accu-Chek Guide test strips) strip USE TO TEST TWICE DAILY ,  MORNING FASTING AND 1 TO 2 HOURS AFTER LARGEST MEAL 200 strip 1    lancets (Accu-Chek Fastclix Lancet Drum) [facility] [place] 1 TO CHECK BLOOD SUGAR TWICE DAILY 200 each 1    losartan (Cozaar) 100 mg tablet TAKE 1 TABLET BY MOUTH ONCE  DAILY 90 tablet 0    multivitamin with minerals tablet Take 1 tablet by mouth once daily.      needles, disposable (BD INTRADERMAL BEVEL NEEDLES MISC) Micro 32Gx 6mm; use as directed for once daily injection      NON FORMULARY Alcohol swabs: use as directed      omeprazole (PriLOSEC) 20 mg DR capsule TAKE 1 CAPSULE BY MOUTH ONCE  DAILY IN THE MORNING TAKE BEFORE A MEAL (Patient taking differently: Take 2 capsules (40 mg) by mouth if needed.) 90 capsule 0    semaglutide 2 mg/dose (8 mg/3 mL) pen injector Inject 2 mg under the skin 1 (one) time per week. 9 mL 1     No current facility-administered medications on file prior to visit.           Assessment and Plan       Cooper Gibbons \"Alirio\"  is a 59 y.o. male who has past medical history of significant NSAID use in the past due to arthritis, hypertension, type 2 diabetes was coming to see me today initial consultation for CKD management per PCP    Impression  # Chronic kidney disease stage IIIa/A1-most like related to prior ATN/NSAID use.  Less " likely diabetic nephropathy in the setting of negative albuminuria  -Baseline serum creatinine 1.4-1.7, GFR 45-50 mL/min per 1.73 m²-stable for the last 4-5 years  -Urine dipstick today is bland with no blood or albumin  -Kidney ultrasound done in 2020 were reviewed and showed right kidney 9.6 cm with evidence of increased echogenicity, left kidney 10 cm.  He had MRA done in 2023 was unremarkable.  Kidney Doppler was mainly unremarkable however limited exam.  Will defer repeating kidney image at this time  -Within normal electrolytes  -Will continue conservative measures.  Will continue RAAS inhibitors.  Will continue GLP RA/Ozempic.  I do not see indication to start SGL 2 inhibitors since no red flags or markers of CKD progression  -For muscular body build, I will send Cystatin C for confirmation of CKD    # Hypertension-good control  -Current medication amlodipine 5 mg, losartan 100 mg  -No changes    # Type 2 diabetes-well-controlled  -A1c 6.8  -Currently on Ozempic  -No history of retinopathy or neuropathy    # CVS-high risk  -Continue statins, RAAS inhibitors, GLP RA.  Will consider SGL 2 Hypotears and finerenone with follow-up    # CKD-MBD  -Check PTH, vitamin D and phosphorus    # No significant anemia-we will monitor    #Others  - No NSAIDs, no contrast as possible. If to be done- we recommend holding ACEi/ARBS/diuretics 24 hrs prior to contrast exposure and ensure appropriate hydration   - Ensure well hydration  - Limit salt in diet  - No smoking    Patient received CKD education and counselling    Repeat blood work at his earliest convenience  Follow-up in 6 months with repeat blood work urinalysis prior to next visit    Ingris Mendez MD, MS, ANTONIETA COTTON   Clinical  - [employer]   Nephrologist - [facility]

## 2024-09-18 NOTE — PATIENT INSTRUCTIONS
"Dear Alirio-it was nice meeting you nephrology clinic today discuss the following    # Chronic kidney disease stage IIIa-baseline kidney function 45-47%.  Most like related to prior acute kidney injury in 2018 with partial recovery.  I am suspecting your creatinine is elevated due to increased muscle mass-I will check the kidney function with another marker \"Cystatin C \"that does not get affected by the muscle mass.  Will get it done at your nearest convenience.  Continue losartan 100 mg.  No protein leak in the urine.  I will consider adding Farxiga in the future-no need now    # Hypertension-blood pressure in good control.  Continue amlodipine 5 mg, losartan 100 mg    # Type 2 diabetes-in good control.  Continue Ozempic    Will get back to you with blood work results.  I am expecting kidney function will be even better than 45%.  Limit salt intake.  No other diet or fluid limitation at this time    Follow-up in 6 months with another repeat blood work analysis prior to next visit    Ingris Mendez MD, MS, ANTONIETA COTTON   Clinical  - [employer]   Nephrologist - [facility]    "

## 2024-09-19 LAB
CREAT UR-MCNC: 111.9 MG/DL (ref 20–370)
MICROALBUMIN UR-MCNC: 8.3 MG/L
MICROALBUMIN/CREAT UR: 7.4 UG/MG CREAT

## 2024-10-01 DIAGNOSIS — Z00.00 HEALTHCARE MAINTENANCE: ICD-10-CM

## 2024-10-05 ENCOUNTER — LAB (OUTPATIENT)
Dept: LAB | Facility: LAB | Age: 59
End: 2024-10-05
Payer: COMMERCIAL

## 2024-10-05 DIAGNOSIS — I10 ESSENTIAL HYPERTENSION: ICD-10-CM

## 2024-10-05 DIAGNOSIS — Z00.00 HEALTHCARE MAINTENANCE: ICD-10-CM

## 2024-10-05 DIAGNOSIS — N18.30 STAGE 3 CHRONIC KIDNEY DISEASE, UNSPECIFIED WHETHER STAGE 3A OR 3B CKD (MULTI): ICD-10-CM

## 2024-10-05 DIAGNOSIS — N18.30 TYPE 2 DM WITH CKD STAGE 3 AND HYPERTENSION (MULTI): ICD-10-CM

## 2024-10-05 DIAGNOSIS — E11.22 TYPE 2 DM WITH CKD STAGE 3 AND HYPERTENSION (MULTI): ICD-10-CM

## 2024-10-05 DIAGNOSIS — I12.9 TYPE 2 DM WITH CKD STAGE 3 AND HYPERTENSION (MULTI): ICD-10-CM

## 2024-10-05 PROCEDURE — 80053 COMPREHEN METABOLIC PANEL: CPT

## 2024-10-05 PROCEDURE — 80061 LIPID PANEL: CPT

## 2024-10-05 PROCEDURE — 36415 COLL VENOUS BLD VENIPUNCTURE: CPT

## 2024-10-05 PROCEDURE — 85025 COMPLETE CBC W/AUTO DIFF WBC: CPT

## 2024-10-05 PROCEDURE — 84153 ASSAY OF PSA TOTAL: CPT

## 2024-10-05 PROCEDURE — 82610 CYSTATIN C: CPT

## 2024-10-05 PROCEDURE — 83036 HEMOGLOBIN GLYCOSYLATED A1C: CPT

## 2024-10-05 PROCEDURE — 84443 ASSAY THYROID STIM HORMONE: CPT

## 2024-10-06 LAB
ALBUMIN SERPL BCP-MCNC: 4.2 G/DL (ref 3.4–5)
ALP SERPL-CCNC: 102 U/L (ref 33–120)
ALT SERPL W P-5'-P-CCNC: 22 U/L (ref 10–52)
ANION GAP SERPL CALC-SCNC: 16 MMOL/L (ref 10–20)
AST SERPL W P-5'-P-CCNC: 12 U/L (ref 9–39)
BASOPHILS # BLD AUTO: 0.02 X10*3/UL (ref 0–0.1)
BASOPHILS NFR BLD AUTO: 0.3 %
BILIRUB SERPL-MCNC: 0.5 MG/DL (ref 0–1.2)
BUN SERPL-MCNC: 23 MG/DL (ref 6–23)
CALCIUM SERPL-MCNC: 9.2 MG/DL (ref 8.6–10.6)
CHLORIDE SERPL-SCNC: 105 MMOL/L (ref 98–107)
CHOLEST SERPL-MCNC: 150 MG/DL (ref 0–199)
CHOLESTEROL/HDL RATIO: 4.3
CO2 SERPL-SCNC: 24 MMOL/L (ref 21–32)
CREAT SERPL-MCNC: 1.91 MG/DL (ref 0.5–1.3)
EGFRCR SERPLBLD CKD-EPI 2021: 40 ML/MIN/1.73M*2
EOSINOPHIL # BLD AUTO: 0.09 X10*3/UL (ref 0–0.7)
EOSINOPHIL NFR BLD AUTO: 1.4 %
ERYTHROCYTE [DISTWIDTH] IN BLOOD BY AUTOMATED COUNT: 12.4 % (ref 11.5–14.5)
EST. AVERAGE GLUCOSE BLD GHB EST-MCNC: 151 MG/DL
GLUCOSE SERPL-MCNC: 156 MG/DL (ref 74–99)
HBA1C MFR BLD: 6.9 %
HCT VFR BLD AUTO: 41.5 % (ref 41–52)
HDLC SERPL-MCNC: 34.5 MG/DL
HGB BLD-MCNC: 13.6 G/DL (ref 13.5–17.5)
IMM GRANULOCYTES # BLD AUTO: 0.04 X10*3/UL (ref 0–0.7)
IMM GRANULOCYTES NFR BLD AUTO: 0.6 % (ref 0–0.9)
LDLC SERPL CALC-MCNC: 85 MG/DL
LYMPHOCYTES # BLD AUTO: 1.67 X10*3/UL (ref 1.2–4.8)
LYMPHOCYTES NFR BLD AUTO: 26.4 %
MCH RBC QN AUTO: 30.3 PG (ref 26–34)
MCHC RBC AUTO-ENTMCNC: 32.8 G/DL (ref 32–36)
MCV RBC AUTO: 92 FL (ref 80–100)
MONOCYTES # BLD AUTO: 0.42 X10*3/UL (ref 0.1–1)
MONOCYTES NFR BLD AUTO: 6.6 %
NEUTROPHILS # BLD AUTO: 4.08 X10*3/UL (ref 1.2–7.7)
NEUTROPHILS NFR BLD AUTO: 64.7 %
NON HDL CHOLESTEROL: 116 MG/DL (ref 0–149)
NRBC BLD-RTO: 0 /100 WBCS (ref 0–0)
PLATELET # BLD AUTO: 281 X10*3/UL (ref 150–450)
POTASSIUM SERPL-SCNC: 4.8 MMOL/L (ref 3.5–5.3)
PROT SERPL-MCNC: 6.6 G/DL (ref 6.4–8.2)
PSA SERPL-MCNC: 0.89 NG/ML
RBC # BLD AUTO: 4.49 X10*6/UL (ref 4.5–5.9)
SODIUM SERPL-SCNC: 140 MMOL/L (ref 136–145)
TRIGL SERPL-MCNC: 151 MG/DL (ref 0–149)
TSH SERPL-ACNC: 1.89 MIU/L (ref 0.44–3.98)
VLDL: 30 MG/DL (ref 0–40)
WBC # BLD AUTO: 6.3 X10*3/UL (ref 4.4–11.3)

## 2024-10-06 NOTE — RESULT ENCOUNTER NOTE
Hemoglobin A1c continues to show stability at 6.9% with previous being at 6.8%    Cholesterol panel also looks good at 150, HDL 34, LDL 85, triglycerides 151    Sugar is elevated 156 but otherwise liver and electrolytes within normal limits    Kidney function still low at 40% and BUN/creatinine appear to be somewhat stable but creatinine is slightly elevated so I would recommend increased water intake if not already doing so    Complete blood cell count shows no anemia    Thyroid and prostate within normal limits

## 2024-10-07 LAB
CYSTATIN C SERPL-MCNC: 1.6 MG/L (ref 0.5–1.2)
GFR/BSA.PRED SERPLBLD CYS-BASED-ARV: 42 ML/MIN/BSA

## 2024-10-18 ENCOUNTER — APPOINTMENT (OUTPATIENT)
Dept: PRIMARY CARE | Facility: CLINIC | Age: 59
End: 2024-10-18
Payer: COMMERCIAL

## 2024-10-18 VITALS
WEIGHT: 288 LBS | HEART RATE: 85 BPM | HEIGHT: 75 IN | OXYGEN SATURATION: 99 % | SYSTOLIC BLOOD PRESSURE: 120 MMHG | BODY MASS INDEX: 35.81 KG/M2 | DIASTOLIC BLOOD PRESSURE: 76 MMHG

## 2024-10-18 DIAGNOSIS — K21.9 LARYNGOPHARYNGEAL REFLUX: ICD-10-CM

## 2024-10-18 DIAGNOSIS — E78.5 HYPERLIPIDEMIA, UNSPECIFIED HYPERLIPIDEMIA TYPE: ICD-10-CM

## 2024-10-18 DIAGNOSIS — Z23 FLU VACCINE NEED: ICD-10-CM

## 2024-10-18 DIAGNOSIS — N18.30 STAGE 3 CHRONIC KIDNEY DISEASE, UNSPECIFIED WHETHER STAGE 3A OR 3B CKD (MULTI): ICD-10-CM

## 2024-10-18 DIAGNOSIS — I12.9 TYPE 2 DM WITH CKD STAGE 3 AND HYPERTENSION (MULTI): ICD-10-CM

## 2024-10-18 DIAGNOSIS — E11.22 TYPE 2 DM WITH CKD STAGE 3 AND HYPERTENSION (MULTI): ICD-10-CM

## 2024-10-18 DIAGNOSIS — N18.30 TYPE 2 DM WITH CKD STAGE 3 AND HYPERTENSION (MULTI): ICD-10-CM

## 2024-10-18 DIAGNOSIS — I10 PRIMARY HYPERTENSION: ICD-10-CM

## 2024-10-18 DIAGNOSIS — Z00.00 HEALTHCARE MAINTENANCE: Primary | ICD-10-CM

## 2024-10-18 DIAGNOSIS — E66.01 SEVERE OBESITY (BMI 35.0-39.9) WITH COMORBIDITY (MULTI): ICD-10-CM

## 2024-10-18 PROCEDURE — 4010F ACE/ARB THERAPY RXD/TAKEN: CPT | Performed by: STUDENT IN AN ORGANIZED HEALTH CARE EDUCATION/TRAINING PROGRAM

## 2024-10-18 PROCEDURE — 99396 PREV VISIT EST AGE 40-64: CPT | Performed by: STUDENT IN AN ORGANIZED HEALTH CARE EDUCATION/TRAINING PROGRAM

## 2024-10-18 PROCEDURE — 1036F TOBACCO NON-USER: CPT | Performed by: STUDENT IN AN ORGANIZED HEALTH CARE EDUCATION/TRAINING PROGRAM

## 2024-10-18 PROCEDURE — 99214 OFFICE O/P EST MOD 30 MIN: CPT | Performed by: STUDENT IN AN ORGANIZED HEALTH CARE EDUCATION/TRAINING PROGRAM

## 2024-10-18 PROCEDURE — 3008F BODY MASS INDEX DOCD: CPT | Performed by: STUDENT IN AN ORGANIZED HEALTH CARE EDUCATION/TRAINING PROGRAM

## 2024-10-18 PROCEDURE — 3074F SYST BP LT 130 MM HG: CPT | Performed by: STUDENT IN AN ORGANIZED HEALTH CARE EDUCATION/TRAINING PROGRAM

## 2024-10-18 PROCEDURE — 90471 IMMUNIZATION ADMIN: CPT | Performed by: STUDENT IN AN ORGANIZED HEALTH CARE EDUCATION/TRAINING PROGRAM

## 2024-10-18 PROCEDURE — 3048F LDL-C <100 MG/DL: CPT | Performed by: STUDENT IN AN ORGANIZED HEALTH CARE EDUCATION/TRAINING PROGRAM

## 2024-10-18 PROCEDURE — 90656 IIV3 VACC NO PRSV 0.5 ML IM: CPT | Performed by: STUDENT IN AN ORGANIZED HEALTH CARE EDUCATION/TRAINING PROGRAM

## 2024-10-18 PROCEDURE — 3061F NEG MICROALBUMINURIA REV: CPT | Performed by: STUDENT IN AN ORGANIZED HEALTH CARE EDUCATION/TRAINING PROGRAM

## 2024-10-18 PROCEDURE — 3044F HG A1C LEVEL LT 7.0%: CPT | Performed by: STUDENT IN AN ORGANIZED HEALTH CARE EDUCATION/TRAINING PROGRAM

## 2024-10-18 PROCEDURE — 3078F DIAST BP <80 MM HG: CPT | Performed by: STUDENT IN AN ORGANIZED HEALTH CARE EDUCATION/TRAINING PROGRAM

## 2024-10-18 RX ORDER — OMEPRAZOLE 20 MG/1
CAPSULE, DELAYED RELEASE ORAL
Qty: 90 CAPSULE | Refills: 0 | Status: CANCELLED | OUTPATIENT
Start: 2024-10-18

## 2024-10-18 RX ORDER — LOSARTAN POTASSIUM 100 MG/1
100 TABLET ORAL DAILY
Qty: 90 TABLET | Refills: 1 | Status: SHIPPED | OUTPATIENT
Start: 2024-10-18

## 2024-10-18 RX ORDER — AMLODIPINE BESYLATE 5 MG/1
5 TABLET ORAL DAILY
Qty: 90 TABLET | Refills: 1 | Status: SHIPPED | OUTPATIENT
Start: 2024-10-18

## 2024-10-18 RX ORDER — ATORVASTATIN CALCIUM 40 MG/1
40 TABLET, FILM COATED ORAL DAILY
Qty: 90 TABLET | Refills: 1 | Status: SHIPPED | OUTPATIENT
Start: 2024-10-18

## 2024-10-18 NOTE — PROGRESS NOTES
"Subjective   Patient ID: Cooper Gibbons \"Alirio\" is a 59 y.o. male who presents for Annual Exam.  Today he is accompanied by alone.     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   Immunization: Tdap June 2022; influenza vaccine willing today  Shingrix up-to-date x2; PPSV23 2021  PCV 20 in 2023  COVID-19 vaccine up-to-date in the chart  Colon Cancer Screening: No family history; Cologuard was negative in 2023, due December 2026  Diet: Attempting to eat a better balanced diet  Exercise: Attempting to exercise regularly  Tobacco: Denies use  EtOH: Rarely  Lab work done just prior to today's visit    2. Hypertension  Continues to take losartan 100 mg daily as indicated in the chart alongside 5 mg daily  He has been doing very well with this medication  Blood pressure well within normal limits  Denies any cardiopulmonary symptoms  Requesting refills to his mail out pharmacy      3. Diabetes mellitus with CKD 3  Continues to take Ozempic as indicated in the chart that 2.0 mg once weekly  Most recent hemoglobin A1c was noted at 6.9%  Feels well and requesting refills to be sent out to his mail out pharmacy  Has a history of now being down over 50 pounds due to the medication  Recently followed up with nephrology in September who is continue to monitor him every 6 months    4.  GERD  Continues to take omeprazole 20-40 mg on an as-needed basis  Feels very well in regards to his acid reflux  Uses this very rarely and feels well     5. Hyperlipidemia  Continues to take atorvastatin 40 mg daily as indicated in chart  Cholesterol appears to be stable as indicated the chart  Denies any myalgias  Requesting refills to his mail out pharmacy  CT cardiac score was performed and showed a value of 0    Current Outpatient Medications on File Prior to Visit   Medication Sig Dispense Refill    amLODIPine (Norvasc) 5 mg tablet Take 1 tablet (5 mg) by mouth once daily. 90 " tablet 0    atorvastatin (Lipitor) 40 mg tablet TAKE 1 TABLET BY MOUTH ONCE  DAILY 90 tablet 0    blood sugar diagnostic (Accu-Chek Guide test strips) strip USE TO TEST TWICE DAILY ,  MORNING FASTING AND 1 TO 2 HOURS AFTER LARGEST MEAL 200 strip 1    lancets (Accu-Chek Fastclix Lancet Drum) [facility] USE 1 TO CHECK BLOOD SUGAR TWICE DAILY 200 each 1    losartan (Cozaar) 100 mg tablet TAKE 1 TABLET BY MOUTH ONCE  DAILY 90 tablet 0    multivitamin with minerals tablet Take 1 tablet by mouth once daily.      needles, disposable (BD INTRADERMAL BEVEL NEEDLES MISC) Micro 32Gx 6mm; use as directed for once daily injection      NON FORMULARY Alcohol swabs: use as directed      omeprazole (PriLOSEC) 20 mg DR capsule TAKE 1 CAPSULE BY MOUTH ONCE  DAILY IN THE MORNING TAKE BEFORE A MEAL (Patient taking differently: Take 2 capsules (40 mg) by mouth if needed.) 90 capsule 0    semaglutide 2 mg/dose (8 mg/3 mL) pen injector Inject 2 mg under the skin 1 (one) time per week. 9 mL 1     No current facility-administered medications on file prior to visit.        No Known Allergies    Immunization History   Administered Date(s) Administered    Flu vaccine (IIV4), preservative free *Check age/dose* 01/31/2018, 10/29/2018, 11/13/2020, 10/26/2022, 11/10/2023    Hep A, Unspecified 11/22/2013    Hepatitis B vaccine, adult *Check Product/Dose* 11/22/2013, 01/15/2014, 05/06/2014    Influenza, seasonal, injectable 10/26/2022    Pfizer Gray Cap SARS-CoV-2 04/09/2022    Pfizer Purple Cap SARS-CoV-2 05/01/2021, 05/22/2021    Pneumococcal conjugate vaccine, 20-valent (PREVNAR 20) 11/10/2023    Pneumococcal polysaccharide vaccine, 23-valent, age 2 years and older (PNEUMOVAX 23) 09/01/2021    SARS-CoV-2, Unspecified 04/09/2022    Tdap vaccine, age 7 year and older (BOOSTRIX, ADACEL) 08/19/2011, 06/05/2022    Zoster vaccine, recombinant, adult (SHINGRIX) 10/26/2022, 03/23/2023         Review of Systems  All pertinent positive symptoms are included in  "the history of present illness.  All other systems have been reviewed and are negative and noncontributory to this patient's current ailments.     Objective   /76 (BP Location: Left arm, Patient Position: Sitting, BP Cuff Size: Adult)   Pulse 85   Ht 1.905 m (6' 3\")   Wt 131 kg (288 lb)   SpO2 99%   BMI 36.00 kg/m²   BSA: 2.63 meters squared  Lab on 10/05/2024   Component Date Value Ref Range Status    Cystatin C 10/05/2024 1.6 (H)  0.5 - 1.2 mg/L Final    Performed By: [facility]  [address] [place] [address]  : Mehdi Garcia MD, PhD  CLIA Number: [id number]    Prostate Specific AG 10/05/2024 0.89  <=4.00 ng/mL Final    Thyroid Stimulating Hormone 10/05/2024 1.89  0.44 - 3.98 mIU/L Final    Cholesterol 10/05/2024 150  0 - 199 mg/dL Final          Age      Desirable   Borderline High   High     0-19 Y     0 - 169       170 - 199     >/= 200    20-24 Y     0 - 189       190 - 224     >/= 225         >24 Y     0 - 199       200 - 239     >/= 240   **All ranges are based on fasting samples. Specific   therapeutic targets will vary based on patient-specific   cardiac risk.    Pediatric guidelines reference:Pediatrics 2011, 128(S5).Adult guidelines reference: NCEP ATPIII Guidelines,ARLETTE 2001, 258:2486-97    Venipuncture immediately after or during the administration of Metamizole may lead to falsely low results. Testing should be performed immediately prior to Metamizole dosing.    HDL-Cholesterol 10/05/2024 34.5  mg/dL Final      Age       Very Low   Low     Normal    High    0-19 Y    < 35      < 40     40-45     ----  20-24 Y    ----     < 40      >45      ----        >24 Y      ----     < 40     40-60      >60      Cholesterol/HDL Ratio 10/05/2024 4.3   Final      Ref Values  Desirable  < 3.4  High Risk  > 5.0    LDL Calculated 10/05/2024 85  <=99 mg/dL Final                                Near   Borderline      AGE      Desirable  Optimal    High     High     " Very High     0-19 Y     0 - 109     ---    110-129   >/= 130     ----    20-24 Y     0 - 119     ---    120-159   >/= 160     ----      >24 Y     0 -  99   100-129  130-159   160-189     >/=190      VLDL 10/05/2024 30  0 - 40 mg/dL Final    Triglycerides 10/05/2024 151 (H)  0 - 149 mg/dL Final       Age         Desirable   Borderline High   High     Very High   0 D-90 D    19 - 174         ----         ----        ----  91 D- 9 Y     0 -  74        75 -  99     >/= 100      ----    10-19 Y     0 -  89        90 - 129     >/= 130      ----    20-24 Y     0 - 114       115 - 149     >/= 150      ----         >24 Y     0 - 149       150 - 199    200- 499    >/= 500    Venipuncture immediately after or during the administration of Metamizole may lead to falsely low results. Testing should be performed immediately prior to Metamizole dosing.    Non HDL Cholesterol 10/05/2024 116  0 - 149 mg/dL Final          Age       Desirable   Borderline High   High     Very High     0-19 Y     0 - 119       120 - 144     >/= 145    >/= 160    20-24 Y     0 - 149       150 - 189     >/= 190      ----         >24 Y    30 mg/dL above LDL Cholesterol goal      Glucose 10/05/2024 156 (H)  74 - 99 mg/dL Final    Sodium 10/05/2024 140  136 - 145 mmol/L Final    Potassium 10/05/2024 4.8  3.5 - 5.3 mmol/L Final    Chloride 10/05/2024 105  98 - 107 mmol/L Final    Bicarbonate 10/05/2024 24  21 - 32 mmol/L Final    Anion Gap 10/05/2024 16  10 - 20 mmol/L Final    Urea Nitrogen 10/05/2024 23  6 - 23 mg/dL Final    Creatinine 10/05/2024 1.91 (H)  0.50 - 1.30 mg/dL Final    eGFR 10/05/2024 40 (L)  >60 mL/min/1.73m*2 Final    Calculations of estimated GFR are performed using the 2021 CKD-EPI Study Refit equation without the race variable for the IDMS-Traceable creatinine methods.  https://jasn.asnjournals.org/content/early/2021/09/22/ASN.7932545686    Calcium 10/05/2024 9.2  8.6 - 10.6 mg/dL Final    Albumin 10/05/2024 4.2  3.4 - 5.0 g/dL Final     Alkaline Phosphatase 10/05/2024 102  33 - 120 U/L Final    Total Protein 10/05/2024 6.6  6.4 - 8.2 g/dL Final    AST 10/05/2024 12  9 - 39 U/L Final    Bilirubin, Total 10/05/2024 0.5  0.0 - 1.2 mg/dL Final    ALT 10/05/2024 22  10 - 52 U/L Final    Patients treated with Sulfasalazine may generate falsely decreased results for ALT.    WBC 10/05/2024 6.3  4.4 - 11.3 x10*3/uL Final    nRBC 10/05/2024 0.0  0.0 - 0.0 /100 WBCs Final    RBC 10/05/2024 4.49 (L)  4.50 - 5.90 x10*6/uL Final    Hemoglobin 10/05/2024 13.6  13.5 - 17.5 g/dL Final    Hematocrit 10/05/2024 41.5  41.0 - 52.0 % Final    MCV 10/05/2024 92  80 - 100 fL Final    MCH 10/05/2024 30.3  26.0 - 34.0 pg Final    MCHC 10/05/2024 32.8  32.0 - 36.0 g/dL Final    RDW 10/05/2024 12.4  11.5 - 14.5 % Final    Platelets 10/05/2024 281  150 - 450 x10*3/uL Final    Neutrophils % 10/05/2024 64.7  40.0 - 80.0 % Final    Immature Granulocytes %, Automated 10/05/2024 0.6  0.0 - 0.9 % Final    Immature Granulocyte Count (IG) includes promyelocytes, myelocytes and metamyelocytes but does not include bands. Percent differential counts (%) should be interpreted in the context of the absolute cell counts (cells/UL).    Lymphocytes % 10/05/2024 26.4  13.0 - 44.0 % Final    Monocytes % 10/05/2024 6.6  2.0 - 10.0 % Final    Eosinophils % 10/05/2024 1.4  0.0 - 6.0 % Final    Basophils % 10/05/2024 0.3  0.0 - 2.0 % Final    Neutrophils Absolute 10/05/2024 4.08  1.20 - 7.70 x10*3/uL Final    Percent differential counts (%) should be interpreted in the context of the absolute cell counts (cells/uL).    Immature Granulocytes Absolute, Au* 10/05/2024 0.04  0.00 - 0.70 x10*3/uL Final    Lymphocytes Absolute 10/05/2024 1.67  1.20 - 4.80 x10*3/uL Final    Monocytes Absolute 10/05/2024 0.42  0.10 - 1.00 x10*3/uL Final    Eosinophils Absolute 10/05/2024 0.09  0.00 - 0.70 x10*3/uL Final    Basophils Absolute 10/05/2024 0.02  0.00 - 0.10 x10*3/uL Final    Hemoglobin A1C 10/05/2024 6.9 (H)   See comment % Final    Estimated Average Glucose 10/05/2024 151  Not Established mg/dL Final    eGFR BY Cystatin C 10/05/2024 42 (L)  >=60 mL/min/BSA Final      INTERPRETIVE INFORMATION: eGFR by Cystatin C    eGFR by Cystatin C was calculated using the CKD-EPI equation.    Stage     Description                    eGFR Range      1.......Normal or increased eGFR.......90 or Greater    2.......Mildly decreased eGFR..........60-89    3.......Moderately decreased eGFR......30-59    4.......Severely decreased eGFR........15-29    5.......Kidney Failure.................Less than 15  Performed By: [facility]  [address] [place] [id number]  : Mehdi Garcia MD, PhD  CLIA Number: [id number]       Physical Exam  CONSTITUTIONAL - well nourished, well developed, looks like stated age, in no acute distress, not ill-appearing, and not tired appearing  SKIN - normal skin color and pigmentation, normal skin turgor without rash, lesions, or nodules visualized  HEAD - no trauma, normocephalic  EYES - normal external exam  ENT - TM's intact, no injection, no signs of infection, uvula midline, normal tongue movement and throat normal, no exudate, nasal passage without discharge and patent  NECK - supple without rigidity, no neck mass was observed, no thyromegaly or thyroid nodules  CHEST - clear to auscultation, no wheezing, no crackles and no rales, good effort  CARDIAC - regular rate and regular rhythm, no skipped beats, no murmur  ABDOMEN - no organomegaly, soft, nontender, nondistended, normal bowel sounds, no guarding/rebound/rigidity, negative McBurney sign and negative Goodrich sign  EXTREMITIES - no edema, no deformities  NEUROLOGICAL - normal gait, normal balance, normal motor, no ataxia  PSYCHIATRIC - alert, pleasant and cordial, age-appropriate  IMMUNOLOGIC - no cervical lymphadenopathy     Assessment/Plan   1. Health maintenance  Complete history and physical examination was  performed  EKG not done today  We will notify of test results once available and make treatment recommendations accordingly   Yuval due 2026  Please attempt eat a well-balanced diet and exercise regularly  Flu vaccine provided after today's visit    2. Hypertension  Stable without any changes recommended  Continue the losartan at 100 mg alongside amlodipine 5 mg  I would like to have you monitor and record blood pressures at home  Blood pressure goal should be below 130/80, ideally 120/80     3.  Diabetes mellitus with CKD 3  Continue Ozempic as currently prescribed at 2 mg once weekly  Hemoglobin A1c at the upper limit of normal for what is acceptable in my opinion at 6.9%  I would recommend diet and exercise at this time we will continue monitoring closely  Continue following up with a nephrologist per her protocol     4.  GERD  Stable without any changes recommended  Continue omeprazole 20-40 mg  Please notify us if/when refills are needed     5.  Hyperlipidemia  Continue taking atorvastatin 40 mg daily without any changes  Lab work appears stable  CT cardiac score did note a value of 0  We will continue monitoring closely      Please follow-up on an as-needed basis and/or in 6 months for continued care

## 2024-10-21 DIAGNOSIS — N18.30 TYPE 2 DM WITH CKD STAGE 3 AND HYPERTENSION (MULTI): ICD-10-CM

## 2024-10-21 DIAGNOSIS — E11.22 TYPE 2 DM WITH CKD STAGE 3 AND HYPERTENSION (MULTI): ICD-10-CM

## 2024-10-21 DIAGNOSIS — I12.9 TYPE 2 DM WITH CKD STAGE 3 AND HYPERTENSION (MULTI): ICD-10-CM

## 2024-10-22 DIAGNOSIS — N18.30 TYPE 2 DM WITH CKD STAGE 3 AND HYPERTENSION (MULTI): ICD-10-CM

## 2024-10-22 DIAGNOSIS — I12.9 TYPE 2 DM WITH CKD STAGE 3 AND HYPERTENSION (MULTI): ICD-10-CM

## 2024-10-22 DIAGNOSIS — E11.22 TYPE 2 DM WITH CKD STAGE 3 AND HYPERTENSION (MULTI): ICD-10-CM

## 2024-10-22 RX ORDER — BLOOD SUGAR DIAGNOSTIC
STRIP MISCELLANEOUS
Qty: 200 STRIP | Refills: 3 | Status: SHIPPED | OUTPATIENT
Start: 2024-10-22

## 2024-10-22 RX ORDER — LANCETS
EACH MISCELLANEOUS
Qty: 204 EACH | Refills: 3 | Status: SHIPPED | OUTPATIENT
Start: 2024-10-22

## 2024-10-31 DIAGNOSIS — K21.9 LARYNGOPHARYNGEAL REFLUX: ICD-10-CM

## 2024-11-01 RX ORDER — OMEPRAZOLE 20 MG/1
CAPSULE, DELAYED RELEASE ORAL
Qty: 90 CAPSULE | Refills: 1 | Status: SHIPPED | OUTPATIENT
Start: 2024-11-01

## 2025-03-13 DIAGNOSIS — E11.22 TYPE 2 DM WITH CKD STAGE 3 AND HYPERTENSION (MULTI): ICD-10-CM

## 2025-03-13 DIAGNOSIS — N18.30 TYPE 2 DM WITH CKD STAGE 3 AND HYPERTENSION (MULTI): ICD-10-CM

## 2025-03-13 DIAGNOSIS — I12.9 TYPE 2 DM WITH CKD STAGE 3 AND HYPERTENSION (MULTI): ICD-10-CM

## 2025-03-13 DIAGNOSIS — E66.01 SEVERE OBESITY (BMI 35.0-39.9) WITH COMORBIDITY (MULTI): ICD-10-CM

## 2025-03-14 RX ORDER — SEMAGLUTIDE 2.68 MG/ML
INJECTION, SOLUTION SUBCUTANEOUS
Qty: 9 ML | Refills: 0 | Status: SHIPPED | OUTPATIENT
Start: 2025-03-14

## 2025-03-17 ENCOUNTER — APPOINTMENT (OUTPATIENT)
Dept: NEPHROLOGY | Facility: CLINIC | Age: 60
End: 2025-03-17
Payer: COMMERCIAL

## 2025-03-17 VITALS
SYSTOLIC BLOOD PRESSURE: 130 MMHG | WEIGHT: 284.6 LBS | TEMPERATURE: 97.5 F | HEIGHT: 75 IN | BODY MASS INDEX: 35.39 KG/M2 | DIASTOLIC BLOOD PRESSURE: 85 MMHG | OXYGEN SATURATION: 97 % | HEART RATE: 83 BPM

## 2025-03-17 DIAGNOSIS — E11.22 TYPE 2 DM WITH CKD STAGE 3 AND HYPERTENSION (MULTI): ICD-10-CM

## 2025-03-17 DIAGNOSIS — N18.30 STAGE 3 CHRONIC KIDNEY DISEASE, UNSPECIFIED WHETHER STAGE 3A OR 3B CKD (MULTI): Primary | ICD-10-CM

## 2025-03-17 DIAGNOSIS — N18.30 TYPE 2 DM WITH CKD STAGE 3 AND HYPERTENSION (MULTI): ICD-10-CM

## 2025-03-17 DIAGNOSIS — I10 ESSENTIAL HYPERTENSION: ICD-10-CM

## 2025-03-17 DIAGNOSIS — I12.9 TYPE 2 DM WITH CKD STAGE 3 AND HYPERTENSION (MULTI): ICD-10-CM

## 2025-03-17 PROCEDURE — 3008F BODY MASS INDEX DOCD: CPT | Performed by: INTERNAL MEDICINE

## 2025-03-17 PROCEDURE — 3079F DIAST BP 80-89 MM HG: CPT | Performed by: INTERNAL MEDICINE

## 2025-03-17 PROCEDURE — 3075F SYST BP GE 130 - 139MM HG: CPT | Performed by: INTERNAL MEDICINE

## 2025-03-17 PROCEDURE — 4010F ACE/ARB THERAPY RXD/TAKEN: CPT | Performed by: INTERNAL MEDICINE

## 2025-03-17 PROCEDURE — 99214 OFFICE O/P EST MOD 30 MIN: CPT | Performed by: INTERNAL MEDICINE

## 2025-03-17 NOTE — PROGRESS NOTES
"Subjective       Cooper HARDY Noelaw \"Alirio\" is a 59 y.o. male who has past medical history of significant NSAID use in the past due to arthritis, hypertension, type 2 diabetes was coming to see me today for CKD follow-up    Last office visit September 2024.  Alirio came alone today.  No kidney recent complaints or concerns.  We got Cystatin C that confirmed stage IIIb chronic kidney disease with GFR Cystatin C 40 mL/min.  Blood pressure is accepted today.  He continues to be on losartan 100 mg.  He lost 10 pounds since last office visit-continues to be on Ozempic.  No leg swelling or shortness of breath.  He did not get blood work done prior to the visit today as instructed-will get blood work today.  Remains to have no red flags for progression.  Will follow-up annually or earlier as needed    Prior notes    Alirio came alone today.  He admits significant NSAID use in the past (more than 2400 mg of NSAIDs daily for years).  He developed severe acute kidney injury in April 2018 that would secondary to dehydration.  Patient had GFR as low as 11 at that time-did not need dialysis.  He Parcher recovered and GFR is currently stable routine 45-50%.  He was evaluated by nephrology in the past for kidney size discrepancy.  Kidney Doppler was unremarkable (limited exam).  He had MRI in 2023 that showed patent results bilateral.  He lost follow-up with nephrology    Today, no kidney related complaints or concerns.  He is aware of history of chronic kidney disease.  Well-controlled hypertension and diabetes.  He is active and has big body built.  No lower urinary tract symptoms.  Most recent blood work done was reviewed with him and showed stable serum creatinine 1.4-1.7 GFR 45-50.  Urine dipstick today with no albuminuria.  Blood pressure is accepted.  I had long discussion with Alirio today regarding CKD.  No red flags for progression.  Will follow conservative measures.  Will continue RAAS inhibitors, blood pressure control and " "diabetes control.  Will follow-up closely      Objective   /85 (BP Location: Left arm, Patient Position: Standing, BP Cuff Size: Large adult)   Pulse 83   Temp 36.4 °C (97.5 °F)   Ht 1.905 m (6' 3\")   Wt 129 kg (284 lb 9.6 oz)   SpO2 97%   BMI 35.57 kg/m²   Wt Readings from Last 3 Encounters:   03/17/25 129 kg (284 lb 9.6 oz)   10/18/24 131 kg (288 lb)   09/18/24 129 kg (285 lb)       Physical Exam    General appearance: no distress awake and alert on room air, euvolemic on exam, big stature  Eyes: non-icteric  HEENT: atrumatic head, PEERLA, moist mucosa  Skin: no apparent rash  Heart: NSR, S1, S2 normal, no murmur or gallop  Lungs: Symmetrical expansion,CTA bilat no wheezing/crackles  Abdomen: soft, nt/nd, obese  Extremities: no edema bilat  Neuro: No FND,asterixis, no focal deficits noticed        Review of Systems     Constitutional: no fever, no chills, no recent weight gain and no recent weight loss.   Eyes: no blurred vision and no diplopia.   ENT: no hearing loss, no earache, no sore throat, no swollen glands in the neck and no nasal discharge.   Cardiovascular: no chest pain, no palpitations and no lower extremity edema.   Respiratory: no shortness of breath, no chronic cough and no shortness of breath during exertion.   Gastrointestinal: no abdominal pain, no constipation, no heartburn, no vomiting, no bloody stools and no change in bowel movements.   Genitourinary: no dysuria and no hematuria.   Musculoskeletal: no arthralgias and no myalgias.   Skin: no rashes and no skin lesions.   Neurological: no headaches and no dizziness.   Psychiatric: no confusion, no depression and no anxiety.   Endocrine: no heat intolerance, no cold intolerance, appetite not increased, no thyroid disorder, no increased urinary frequency and no dry skin.   Hematologic/Lymphatic: does not bleed easily and does not bruise easily.   All other systems have been reviewed and are negative for complaint.         Data " "Review                   Lab Results   Component Value Date    URICACID 5.1 04/18/2020           Lab Results   Component Value Date    HGBA1C 6.9 (H) 10/05/2024                 No lab exists for component: \"CR\", \"PHOSPHORUS\"        Albumin/Creatinine Ratio   Date Value Ref Range Status   09/18/2024 7.4 <30.0 ug/mg Creat Final     Albumin/Creatine Ratio   Date Value Ref Range Status   01/28/2023 SEE COMMENT 0.0 - 30.0 ug/mg crt Final     Comment:     One or more analytes used in this calculation   is outside of the analytical measurement range.  Calculation cannot be performed.     02/16/2022 SEE COMMENT 0.0 - 30.0 ug/mg crt Final     Comment:     One or more analytes used in this calculation   is outside of the analytical measurement range.  Calculation cannot be performed.              RFP  Recent Labs     10/05/24  0953 05/17/24  1041 04/06/24  1010 01/28/23  0947 02/16/22  0840 09/03/21  0948 10/26/20  1643 08/01/20  0812 08/17/19  0930 11/05/18  0745 10/27/18  1136 05/08/18  1615 04/23/18  1532 04/18/18  0824 04/16/18  0619 04/15/18  1558 04/15/18  0545    142 141 140 142 142 142  --    < > 141   < > 139 143 144   < > 137 140   K 4.8 4.2 5.0 4.1 4.8 4.6 4.4  --    < > 4.5   < > 4.1 4.9 4.3   < > 4.0 4.1    107 105 105 107 106 107  --    < > 105   < > 103 108* 108*   < > 105 108*   CO2 24 26 26 28 26 30 27  --    < > 26   < > 26 26 26   < > 25 22   BUN 23 29* 26* 17 22 25* 18  --    < > 19   < > 23 21 27*   < > 37* 34*   CREATININE 1.91* 1.67* 1.72* 1.48* 1.44* 1.62* 1.56*  --    < > 1.36*   < > 1.51* 2.10* 2.76*   < > 4.40* 4.17*   GLUCOSE 156* 147* 136* 123* 102* 111* 83  --    < > 128*   < > 95 122* 135*   < > 110* 104*   CALCIUM 9.2 9.2 9.9 9.3 9.4 9.5 9.8  --    < > 9.8   < > 9.7 9.6 9.1   < > 8.4* 8.4*   PHOS  --   --   --   --   --   --   --  3.4  --  2.8  --  4.2 3.6 3.4  --  4.2 4.3   EGFR 40* 47* 45*  --   --   --   --   --   --   --   --   --   --   --   --   --   --    ANIONGAP 16 13 15 11 " "14 11 12  --    < > 15   < > 14 14 14   < > 11 14    < > = values in this interval not displayed.        Urineanalysis  Recent Labs     09/18/24  1625 02/16/22  0840 08/01/20  0812 10/27/18  1136 04/14/18  1745   COLORU Yellow YELLOW  --  STRAW YELLOW   APPEARANCEU Hazy* CLEAR  --  CLEAR CLEAR   SPECGRAVU 1.015 1.014  --  1.011 1.006   JESSICA 6.0 5.0  --  6.0 5.0   PROTUR NEGATIVE NEGATIVE 6 NEGATIVE 30 (1+)*   GLUCOSEU NEGATIVE NEGATIVE  --  NEGATIVE NEGATIVE   BLOODU NEGATIVE NEGATIVE  --  NEGATIVE NEGATIVE   KETONESU NEGATIVE NEGATIVE  --  NEGATIVE NEGATIVE   BILIRUBINU NEGATIVE NEGATIVE  --  NEGATIVE NEGATIVE   NITRITEU NEGATIVE NEGATIVE  --  NEGATIVE NEGATIVE   LEUKOCYTESU  --  NEGATIVE  --  NEGATIVE NEGATIVE       Urine Electrolytes  Recent Labs     09/18/24  1627 09/18/24  1625 01/28/23  0947 02/16/22  0840 09/03/21  0948 08/01/20  0812 10/27/18  1136 04/14/18  1745   CREATU 111.9  107.9  --  127.0 112.0 174.0 106.0  --   --    PROTUR  --  NEGATIVE  --  NEGATIVE  --  6 NEGATIVE 30 (1+)*   UTPCR 0.07  --   --   --   --   --   --   --    ALBUMINUR 8.3  --   --   --   --   --   --   --    MICROALBCREA 7.4  --  SEE COMMENT SEE COMMENT 6.4 SEE COMMENT  --   --         Urine Micro  Recent Labs     04/14/18  1745   WBCU 0-5   RBCU 0-5   HYALCASTU OCCASIONAL*   BACTERIAU 1+*   MUCUSU 2+        Iron  No results for input(s): \"IRON\", \"TIBC\", \"IRONSAT\", \"FERRITIN\" in the last [id number] hours.       Current Outpatient Medications on File Prior to Visit   Medication Sig Dispense Refill    amLODIPine (Norvasc) 5 mg tablet Take 1 tablet (5 mg) by mouth once daily. 90 tablet 1    atorvastatin (Lipitor) 40 mg tablet Take 1 tablet (40 mg) by mouth once daily. 90 tablet 1    blood sugar diagnostic (Accu-Chek Guide test strips) strip USE TO TEST TWICE DAILY IN THE  MORNING FASTING AND 1 TO 2 HOURS AFTER LARGEST MEAL 200 strip 3    cinnamon bark (CINNAMON ORAL) Take by mouth once daily. [place]      lancets (Accu-Chek Fastclix Lancet " "[place]rum) [facility] [place] CHECK BLOOD SUGAR TWICE DAILY 204 each 3    losartan (Cozaar) 100 mg tablet Take 1 tablet (100 mg) by mouth once daily. 90 tablet 1    multivitamin with minerals tablet Take 1 tablet by mouth once daily.      needles, disposable (BD INTRADERMAL BEVEL NEEDLES MISC) Micro 32Gx 6mm; use as directed for once daily injection      NON FORMULARY Alcohol swabs: use as directed      omeprazole (PriLOSEC) 20 mg DR capsule TAKE 1 CAPSULE BY MOUTH ONCE  DAILY IN THE MORNING TAKE BEFORE A MEAL (Patient taking differently: if needed.) 90 capsule 1    Ozempic 2 mg/dose (8 mg/3 mL) pen injector INJECT SUBCUTANEOUSLY 2 MG EVERY WEEK 9 mL 0    [DISCONTINUED] semaglutide 2 mg/dose (8 mg/3 mL) pen injector Inject 2 mg under the skin 1 (one) time per week. 9 mL 1     No current facility-administered medications on file prior to visit.           Assessment and Plan       Cooper Gibbons \"Alirio\"  is a 59 y.o. male who has past medical history of significant NSAID use in the past due to arthritis, hypertension, type 2 diabetes was coming to see me today for CKD follow-up    Impression  # Chronic kidney disease stage IIIa/A1-most like related to prior ATN/NSAID use.  Less likely diabetic nephropathy in the setting of negative albuminuria and well-controlled type 2 diabetes  -Baseline serum creatinine 1.4-1.7, GFR 45-50 mL/min per 1.73 m²-stable for the last 4-5 years  -Urine dipstick earlier was bland with no blood or albumin.  Spot test ACR is negative  -Kidney ultrasound done in 2020 were reviewed and showed right kidney 9.6 cm with evidence of increased echogenicity, left kidney 10 cm.  He had MRA done in 2023 was unremarkable.  Kidney Doppler was mainly unremarkable however limited exam.  Will defer repeating kidney image at this time  -Within normal electrolytes  -Will continue conservative measures.  Will continue RAAS inhibitors.  Will continue GLP RA/Ozempic.  I do not see indication to start SGL 2 inhibitors since no red " flags or markers of CKD progression  -Cystatin C GFR agrees with creatinine-GFR    # Hypertension-good control  -Current medication amlodipine 5 mg, losartan 100 mg  -No changes    # Type 2 diabetes-well-controlled  -A1c 6.8  -Currently on Ozempic  -No history of retinopathy or neuropathy    # CVS-high risk  -Continue statins, RAAS inhibitors, GLP RA.  Will consider SGL 2 Hypotears and finerenone with follow-up    # CKD-MBD  -Check PTH, vitamin D and phosphorus    # No significant anemia-we will monitor    #Others  - No NSAIDs, no contrast as possible. If to be done- we recommend holding ACEi/ARBS/diuretics 24 hrs prior to contrast exposure and ensure appropriate hydration   - Ensure well hydration  - Limit salt in diet  - No smoking    Patient received CKD education and counselling    Repeat blood work at his earliest convenience  Follow-up in 12 months with repeat blood work urinalysis prior to next visit    Ingris Mendez MD, MS, ANTONIETA COTTON   Clinical  - [employer]   Nephrologist - [facility] - [facility]

## 2025-03-17 NOTE — PATIENT INSTRUCTIONS
Deakeron Amezquita-it was nice meeting you nephrology clinic today discuss the following    # Chronic kidney disease stage IIIa-baseline kidney function 40-50 %.  Most like related to prior acute kidney injury in 2018 with partial recovery.  Repeat blood work today.  Continue losartan 100 mg.  No protein leak in the urine.  I will consider adding Farxiga in the future-no need now    # Hypertension-blood pressure in good control.  Continue amlodipine 5 mg, losartan 100 mg    # Type 2 diabetes-in good control.  Continue Ozempic    Blood work today-if stable we will follow-up in 12 months with another repeat blood work analysis prior to next visit    Ingris Mendez MD, MS, ANTONIETA COTTON   Clinical  - [facility] [employer] [facility] [employer]   Nephrologist - [facility]

## 2025-03-18 LAB
25(OH)D3+25(OH)D2 SERPL-MCNC: 38 NG/ML (ref 30–100)
ALBUMIN SERPL-MCNC: 4.5 G/DL (ref 3.6–5.1)
BUN SERPL-MCNC: 30 MG/DL (ref 7–25)
BUN/CREAT SERPL: 15 (CALC) (ref 6–22)
CALCIUM SERPL-MCNC: 9.5 MG/DL (ref 8.6–10.3)
CHLORIDE SERPL-SCNC: 105 MMOL/L (ref 98–110)
CO2 SERPL-SCNC: 23 MMOL/L (ref 20–32)
CREAT SERPL-MCNC: 2.02 MG/DL (ref 0.7–1.3)
EGFRCR SERPLBLD CKD-EPI 2021: 37 ML/MIN/1.73M2
ERYTHROCYTE [DISTWIDTH] IN BLOOD BY AUTOMATED COUNT: 13 % (ref 11–15)
FERRITIN SERPL-MCNC: 173 NG/ML (ref 38–380)
GLUCOSE SERPL-MCNC: 68 MG/DL (ref 65–99)
HCT VFR BLD AUTO: 43 % (ref 38.5–50)
HGB BLD-MCNC: 14.1 G/DL (ref 13.2–17.1)
IRON SATN MFR SERPL: 27 % (CALC) (ref 20–48)
IRON SERPL-MCNC: 89 MCG/DL (ref 50–180)
MCH RBC QN AUTO: 30.7 PG (ref 27–33)
MCHC RBC AUTO-ENTMCNC: 32.8 G/DL (ref 32–36)
MCV RBC AUTO: 93.5 FL (ref 80–100)
PHOSPHATE SERPL-MCNC: 2.7 MG/DL (ref 2.5–4.5)
PLATELET # BLD AUTO: 313 THOUSAND/UL (ref 140–400)
PMV BLD REES-ECKER: 9.3 FL (ref 7.5–12.5)
POTASSIUM SERPL-SCNC: 4.6 MMOL/L (ref 3.5–5.3)
PTH-INTACT SERPL-MCNC: 59 PG/ML (ref 16–77)
RBC # BLD AUTO: 4.6 MILLION/UL (ref 4.2–5.8)
SODIUM SERPL-SCNC: 139 MMOL/L (ref 135–146)
TIBC SERPL-MCNC: 329 MCG/DL (CALC) (ref 250–425)
URATE SERPL-MCNC: 5.9 MG/DL (ref 4–8)
WBC # BLD AUTO: 9.8 THOUSAND/UL (ref 3.8–10.8)

## 2025-03-20 ENCOUNTER — APPOINTMENT (OUTPATIENT)
Dept: NEPHROLOGY | Facility: CLINIC | Age: 60
End: 2025-03-20
Payer: COMMERCIAL

## 2025-04-11 DIAGNOSIS — I10 PRIMARY HYPERTENSION: ICD-10-CM

## 2025-04-11 RX ORDER — AMLODIPINE BESYLATE 5 MG/1
5 TABLET ORAL DAILY
Qty: 5 TABLET | Refills: 0 | Status: SHIPPED | OUTPATIENT
Start: 2025-04-11 | End: 2025-04-16

## 2025-04-12 ENCOUNTER — APPOINTMENT (OUTPATIENT)
Dept: CARDIOLOGY | Facility: HOSPITAL | Age: 60
End: 2025-04-12
Payer: COMMERCIAL

## 2025-04-12 ENCOUNTER — HOSPITAL ENCOUNTER (OUTPATIENT)
Facility: HOSPITAL | Age: 60
Setting detail: OBSERVATION
Discharge: HOME | End: 2025-04-12
Attending: EMERGENCY MEDICINE | Admitting: STUDENT IN AN ORGANIZED HEALTH CARE EDUCATION/TRAINING PROGRAM
Payer: COMMERCIAL

## 2025-04-12 ENCOUNTER — APPOINTMENT (OUTPATIENT)
Dept: RADIOLOGY | Facility: HOSPITAL | Age: 60
End: 2025-04-12
Payer: COMMERCIAL

## 2025-04-12 VITALS
WEIGHT: 275 LBS | DIASTOLIC BLOOD PRESSURE: 81 MMHG | RESPIRATION RATE: 20 BRPM | HEIGHT: 75 IN | OXYGEN SATURATION: 99 % | SYSTOLIC BLOOD PRESSURE: 124 MMHG | TEMPERATURE: 96.1 F | HEART RATE: 66 BPM | BODY MASS INDEX: 34.19 KG/M2

## 2025-04-12 DIAGNOSIS — N17.9 AKI (ACUTE KIDNEY INJURY): ICD-10-CM

## 2025-04-12 DIAGNOSIS — R07.9 CHEST PAIN, UNSPECIFIED TYPE: Primary | ICD-10-CM

## 2025-04-12 PROBLEM — N18.31 ACUTE KIDNEY INJURY SUPERIMPOSED ON STAGE 3A CHRONIC KIDNEY DISEASE: Status: ACTIVE | Noted: 2025-04-12

## 2025-04-12 PROBLEM — R79.89 ELEVATED LFTS: Status: ACTIVE | Noted: 2025-04-12

## 2025-04-12 LAB
ALBUMIN SERPL BCP-MCNC: 4.1 G/DL (ref 3.4–5)
ALP SERPL-CCNC: 106 U/L (ref 33–136)
ALT SERPL W P-5'-P-CCNC: 69 U/L (ref 10–52)
AMPHETAMINES UR QL SCN: NORMAL
ANION GAP SERPL CALC-SCNC: 11 MMOL/L (ref 10–20)
AORTIC VALVE MEAN GRADIENT: 5 MMHG
AORTIC VALVE PEAK VELOCITY: 1.45 M/S
APPEARANCE UR: CLEAR
AST SERPL W P-5'-P-CCNC: 99 U/L (ref 9–39)
AV PEAK GRADIENT: 8 MMHG
AVA (PEAK VEL): 4.04 CM2
AVA (VTI): 3.82 CM2
BARBITURATES UR QL SCN: NORMAL
BASOPHILS # BLD AUTO: 0.02 X10*3/UL (ref 0–0.1)
BASOPHILS NFR BLD AUTO: 0.2 %
BENZODIAZ UR QL SCN: NORMAL
BILIRUB SERPL-MCNC: 0.8 MG/DL (ref 0–1.2)
BILIRUB UR STRIP.AUTO-MCNC: NEGATIVE MG/DL
BUN SERPL-MCNC: 37 MG/DL (ref 6–23)
BZE UR QL SCN: NORMAL
CALCIUM SERPL-MCNC: 9.3 MG/DL (ref 8.6–10.3)
CANNABINOIDS UR QL SCN: NORMAL
CARDIAC TROPONIN I PNL SERPL HS: 3 NG/L (ref 0–20)
CARDIAC TROPONIN I PNL SERPL HS: 4 NG/L (ref 0–20)
CHLORIDE SERPL-SCNC: 108 MMOL/L (ref 98–107)
CHOLEST SERPL-MCNC: 138 MG/DL (ref 0–199)
CHOLESTEROL/HDL RATIO: 3.3
CO2 SERPL-SCNC: 25 MMOL/L (ref 21–32)
COLOR UR: NORMAL
CREAT SERPL-MCNC: 2.48 MG/DL (ref 0.5–1.3)
EGFRCR SERPLBLD CKD-EPI 2021: 29 ML/MIN/1.73M*2
EJECTION FRACTION APICAL 4 CHAMBER: 60.1
EJECTION FRACTION: 62 %
EOSINOPHIL # BLD AUTO: 0.04 X10*3/UL (ref 0–0.7)
EOSINOPHIL NFR BLD AUTO: 0.5 %
ERYTHROCYTE [DISTWIDTH] IN BLOOD BY AUTOMATED COUNT: 13.2 % (ref 11.5–14.5)
FENTANYL+NORFENTANYL UR QL SCN: NORMAL
GLUCOSE BLD MANUAL STRIP-MCNC: 117 MG/DL (ref 74–99)
GLUCOSE BLD MANUAL STRIP-MCNC: 192 MG/DL (ref 74–99)
GLUCOSE SERPL-MCNC: 196 MG/DL (ref 74–99)
GLUCOSE UR STRIP.AUTO-MCNC: NORMAL MG/DL
HCT VFR BLD AUTO: 42.2 % (ref 41–52)
HDLC SERPL-MCNC: 41.6 MG/DL
HGB BLD-MCNC: 13.7 G/DL (ref 13.5–17.5)
IMM GRANULOCYTES # BLD AUTO: 0.04 X10*3/UL (ref 0–0.7)
IMM GRANULOCYTES NFR BLD AUTO: 0.5 % (ref 0–0.9)
KETONES UR STRIP.AUTO-MCNC: NEGATIVE MG/DL
LDLC SERPL CALC-MCNC: 75 MG/DL
LEFT ATRIUM VOLUME AREA LENGTH INDEX BSA: 26.3 ML/M2
LEFT VENTRICLE INTERNAL DIMENSION DIASTOLE: 4.41 CM (ref 3.5–6)
LEFT VENTRICULAR OUTFLOW TRACT DIAMETER: 2.32 CM
LEUKOCYTE ESTERASE UR QL STRIP.AUTO: NEGATIVE
LYMPHOCYTES # BLD AUTO: 1.74 X10*3/UL (ref 1.2–4.8)
LYMPHOCYTES NFR BLD AUTO: 21.3 %
MAGNESIUM SERPL-MCNC: 1.97 MG/DL (ref 1.6–2.4)
MCH RBC QN AUTO: 30.4 PG (ref 26–34)
MCHC RBC AUTO-ENTMCNC: 32.5 G/DL (ref 32–36)
MCV RBC AUTO: 94 FL (ref 80–100)
METHADONE UR QL SCN: NORMAL
MITRAL VALVE E/A RATIO: 1.11
MONOCYTES # BLD AUTO: 0.52 X10*3/UL (ref 0.1–1)
MONOCYTES NFR BLD AUTO: 6.4 %
NEUTROPHILS # BLD AUTO: 5.81 X10*3/UL (ref 1.2–7.7)
NEUTROPHILS NFR BLD AUTO: 71.1 %
NITRITE UR QL STRIP.AUTO: NEGATIVE
NON HDL CHOLESTEROL: 96 MG/DL (ref 0–149)
NRBC BLD-RTO: 0 /100 WBCS (ref 0–0)
OPIATES UR QL SCN: NORMAL
OXYCODONE+OXYMORPHONE UR QL SCN: NORMAL
PCP UR QL SCN: NORMAL
PH UR STRIP.AUTO: 5.5 [PH]
PLATELET # BLD AUTO: 301 X10*3/UL (ref 150–450)
POTASSIUM SERPL-SCNC: 4.4 MMOL/L (ref 3.5–5.3)
PROT SERPL-MCNC: 7 G/DL (ref 6.4–8.2)
PROT UR STRIP.AUTO-MCNC: NEGATIVE MG/DL
RBC # BLD AUTO: 4.51 X10*6/UL (ref 4.5–5.9)
RBC # UR STRIP.AUTO: NEGATIVE MG/DL
RIGHT VENTRICLE FREE WALL PEAK S': 12 CM/S
RIGHT VENTRICLE PEAK SYSTOLIC PRESSURE: 21.3 MMHG
SODIUM SERPL-SCNC: 140 MMOL/L (ref 136–145)
SP GR UR STRIP.AUTO: 1.01
TRICUSPID ANNULAR PLANE SYSTOLIC EXCURSION: 1.8 CM
TRIGL SERPL-MCNC: 105 MG/DL (ref 0–149)
UROBILINOGEN UR STRIP.AUTO-MCNC: NORMAL MG/DL
VLDL: 21 MG/DL (ref 0–40)
WBC # BLD AUTO: 8.2 X10*3/UL (ref 4.4–11.3)

## 2025-04-12 PROCEDURE — 84484 ASSAY OF TROPONIN QUANT: CPT | Performed by: EMERGENCY MEDICINE

## 2025-04-12 PROCEDURE — G0378 HOSPITAL OBSERVATION PER HR: HCPCS

## 2025-04-12 PROCEDURE — 2500000004 HC RX 250 GENERAL PHARMACY W/ HCPCS (ALT 636 FOR OP/ED): Performed by: STUDENT IN AN ORGANIZED HEALTH CARE EDUCATION/TRAINING PROGRAM

## 2025-04-12 PROCEDURE — 71046 X-RAY EXAM CHEST 2 VIEWS: CPT

## 2025-04-12 PROCEDURE — 82565 ASSAY OF CREATININE: CPT | Mod: 59 | Performed by: STUDENT IN AN ORGANIZED HEALTH CARE EDUCATION/TRAINING PROGRAM

## 2025-04-12 PROCEDURE — 2500000001 HC RX 250 WO HCPCS SELF ADMINISTERED DRUGS (ALT 637 FOR MEDICARE OP): Performed by: STUDENT IN AN ORGANIZED HEALTH CARE EDUCATION/TRAINING PROGRAM

## 2025-04-12 PROCEDURE — 36415 COLL VENOUS BLD VENIPUNCTURE: CPT

## 2025-04-12 PROCEDURE — 83735 ASSAY OF MAGNESIUM: CPT

## 2025-04-12 PROCEDURE — 80053 COMPREHEN METABOLIC PANEL: CPT

## 2025-04-12 PROCEDURE — 2500000004 HC RX 250 GENERAL PHARMACY W/ HCPCS (ALT 636 FOR OP/ED)

## 2025-04-12 PROCEDURE — C8929 TTE W OR WO FOL WCON,DOPPLER: HCPCS

## 2025-04-12 PROCEDURE — 80307 DRUG TEST PRSMV CHEM ANLYZR: CPT | Performed by: STUDENT IN AN ORGANIZED HEALTH CARE EDUCATION/TRAINING PROGRAM

## 2025-04-12 PROCEDURE — 80061 LIPID PANEL: CPT | Performed by: STUDENT IN AN ORGANIZED HEALTH CARE EDUCATION/TRAINING PROGRAM

## 2025-04-12 PROCEDURE — 36415 COLL VENOUS BLD VENIPUNCTURE: CPT | Performed by: EMERGENCY MEDICINE

## 2025-04-12 PROCEDURE — 82947 ASSAY GLUCOSE BLOOD QUANT: CPT

## 2025-04-12 PROCEDURE — 93306 TTE W/DOPPLER COMPLETE: CPT | Performed by: STUDENT IN AN ORGANIZED HEALTH CARE EDUCATION/TRAINING PROGRAM

## 2025-04-12 PROCEDURE — 85025 COMPLETE CBC W/AUTO DIFF WBC: CPT

## 2025-04-12 PROCEDURE — 99235 HOSP IP/OBS SAME DATE MOD 70: CPT | Performed by: STUDENT IN AN ORGANIZED HEALTH CARE EDUCATION/TRAINING PROGRAM

## 2025-04-12 PROCEDURE — 81003 URINALYSIS AUTO W/O SCOPE: CPT | Mod: 59

## 2025-04-12 PROCEDURE — 71046 X-RAY EXAM CHEST 2 VIEWS: CPT | Mod: FOREIGN READ | Performed by: RADIOLOGY

## 2025-04-12 PROCEDURE — 99285 EMERGENCY DEPT VISIT HI MDM: CPT | Mod: 25 | Performed by: EMERGENCY MEDICINE

## 2025-04-12 PROCEDURE — 93005 ELECTROCARDIOGRAM TRACING: CPT

## 2025-04-12 RX ORDER — ALUMINUM HYDROXIDE, MAGNESIUM HYDROXIDE, AND SIMETHICONE 1200; 120; 1200 MG/30ML; MG/30ML; MG/30ML
30 SUSPENSION ORAL 4 TIMES DAILY PRN
Status: DISCONTINUED | OUTPATIENT
Start: 2025-04-12 | End: 2025-04-12 | Stop reason: HOSPADM

## 2025-04-12 RX ORDER — ATORVASTATIN CALCIUM 40 MG/1
40 TABLET, FILM COATED ORAL NIGHTLY
Status: DISCONTINUED | OUTPATIENT
Start: 2025-04-12 | End: 2025-04-12 | Stop reason: HOSPADM

## 2025-04-12 RX ORDER — PANTOPRAZOLE SODIUM 40 MG/1
40 TABLET, DELAYED RELEASE ORAL DAILY
Status: DISCONTINUED | OUTPATIENT
Start: 2025-04-12 | End: 2025-04-12 | Stop reason: HOSPADM

## 2025-04-12 RX ORDER — NAPROXEN SODIUM 220 MG/1
81 TABLET, FILM COATED ORAL DAILY
Status: DISCONTINUED | OUTPATIENT
Start: 2025-04-13 | End: 2025-04-12 | Stop reason: HOSPADM

## 2025-04-12 RX ORDER — AMLODIPINE BESYLATE 5 MG/1
5 TABLET ORAL DAILY
Status: DISCONTINUED | OUTPATIENT
Start: 2025-04-12 | End: 2025-04-12 | Stop reason: HOSPADM

## 2025-04-12 RX ORDER — ACETAMINOPHEN 325 MG/1
650 TABLET ORAL EVERY 4 HOURS PRN
Status: DISCONTINUED | OUTPATIENT
Start: 2025-04-12 | End: 2025-04-12 | Stop reason: HOSPADM

## 2025-04-12 RX ORDER — INSULIN LISPRO 100 [IU]/ML
0-5 INJECTION, SOLUTION INTRAVENOUS; SUBCUTANEOUS
Status: DISCONTINUED | OUTPATIENT
Start: 2025-04-12 | End: 2025-04-12 | Stop reason: HOSPADM

## 2025-04-12 RX ADMIN — PERFLUTREN 2 ML OF DILUTION: 6.52 INJECTION, SUSPENSION INTRAVENOUS at 15:29

## 2025-04-12 RX ADMIN — SODIUM CHLORIDE 500 ML: 0.9 INJECTION, SOLUTION INTRAVENOUS at 11:16

## 2025-04-12 RX ADMIN — AMLODIPINE BESYLATE 5 MG: 5 TABLET ORAL at 16:43

## 2025-04-12 RX ADMIN — PANTOPRAZOLE SODIUM 40 MG: 40 TABLET, DELAYED RELEASE ORAL at 16:43

## 2025-04-12 SDOH — ECONOMIC STABILITY: FOOD INSECURITY: WITHIN THE PAST 12 MONTHS, YOU WORRIED THAT YOUR FOOD WOULD RUN OUT BEFORE YOU GOT THE MONEY TO BUY MORE.: NEVER TRUE

## 2025-04-12 SDOH — SOCIAL STABILITY: SOCIAL INSECURITY: ABUSE: ADULT

## 2025-04-12 SDOH — SOCIAL STABILITY: SOCIAL INSECURITY: WITHIN THE LAST YEAR, HAVE YOU BEEN HUMILIATED OR EMOTIONALLY ABUSED IN OTHER WAYS BY YOUR PARTNER OR EX-PARTNER?: NO

## 2025-04-12 SDOH — ECONOMIC STABILITY: FOOD INSECURITY: HOW HARD IS IT FOR YOU TO PAY FOR THE VERY BASICS LIKE FOOD, HOUSING, MEDICAL CARE, AND HEATING?: VERY HARD

## 2025-04-12 SDOH — SOCIAL STABILITY: SOCIAL INSECURITY
WITHIN THE LAST YEAR, HAVE YOU BEEN RAPED OR FORCED TO HAVE ANY KIND OF SEXUAL ACTIVITY BY YOUR PARTNER OR EX-PARTNER?: NO

## 2025-04-12 SDOH — ECONOMIC STABILITY: FOOD INSECURITY: WITHIN THE PAST 12 MONTHS, THE FOOD YOU BOUGHT JUST DIDN'T LAST AND YOU DIDN'T HAVE MONEY TO GET MORE.: NEVER TRUE

## 2025-04-12 SDOH — SOCIAL STABILITY: SOCIAL INSECURITY
WITHIN THE LAST YEAR, HAVE YOU BEEN KICKED, HIT, SLAPPED, OR OTHERWISE PHYSICALLY HURT BY YOUR PARTNER OR EX-PARTNER?: NO

## 2025-04-12 SDOH — ECONOMIC STABILITY: HOUSING INSECURITY: IN THE LAST 12 MONTHS, WAS THERE A TIME WHEN YOU WERE NOT ABLE TO PAY THE MORTGAGE OR RENT ON TIME?: NO

## 2025-04-12 SDOH — SOCIAL STABILITY: SOCIAL INSECURITY: WERE YOU ABLE TO COMPLETE ALL THE BEHAVIORAL HEALTH SCREENINGS?: YES

## 2025-04-12 SDOH — SOCIAL STABILITY: SOCIAL INSECURITY: WITHIN THE LAST YEAR, HAVE YOU BEEN AFRAID OF YOUR PARTNER OR EX-PARTNER?: NO

## 2025-04-12 SDOH — HEALTH STABILITY: PHYSICAL HEALTH: ON AVERAGE, HOW MANY DAYS PER WEEK DO YOU ENGAGE IN MODERATE TO STRENUOUS EXERCISE (LIKE A BRISK WALK)?: 7 DAYS

## 2025-04-12 SDOH — SOCIAL STABILITY: SOCIAL INSECURITY: DOES ANYONE TRY TO KEEP YOU FROM HAVING/CONTACTING OTHER FRIENDS OR DOING THINGS OUTSIDE YOUR HOME?: NO

## 2025-04-12 SDOH — ECONOMIC STABILITY: HOUSING INSECURITY: AT ANY TIME IN THE PAST 12 MONTHS, WERE YOU HOMELESS OR LIVING IN A SHELTER (INCLUDING NOW)?: NO

## 2025-04-12 SDOH — SOCIAL STABILITY: SOCIAL INSECURITY: HAS ANYONE EVER THREATENED TO HURT YOUR FAMILY OR YOUR PETS?: NO

## 2025-04-12 SDOH — ECONOMIC STABILITY: HOUSING INSECURITY: IN THE PAST 12 MONTHS, HOW MANY TIMES HAVE YOU MOVED WHERE YOU WERE LIVING?: 0

## 2025-04-12 SDOH — ECONOMIC STABILITY: INCOME INSECURITY: IN THE PAST 12 MONTHS HAS THE ELECTRIC, GAS, OIL, OR WATER COMPANY THREATENED TO SHUT OFF SERVICES IN YOUR HOME?: NO

## 2025-04-12 SDOH — SOCIAL STABILITY: SOCIAL INSECURITY: ARE THERE ANY APPARENT SIGNS OF INJURIES/BEHAVIORS THAT COULD BE RELATED TO ABUSE/NEGLECT?: NO

## 2025-04-12 SDOH — HEALTH STABILITY: PHYSICAL HEALTH: ON AVERAGE, HOW MANY MINUTES DO YOU ENGAGE IN EXERCISE AT THIS LEVEL?: 120 MIN

## 2025-04-12 SDOH — SOCIAL STABILITY: SOCIAL INSECURITY: DO YOU FEEL ANYONE HAS EXPLOITED OR TAKEN ADVANTAGE OF YOU FINANCIALLY OR OF YOUR PERSONAL PROPERTY?: NO

## 2025-04-12 SDOH — SOCIAL STABILITY: SOCIAL INSECURITY: ARE YOU OR HAVE YOU BEEN THREATENED OR ABUSED PHYSICALLY, EMOTIONALLY, OR SEXUALLY BY ANYONE?: NO

## 2025-04-12 SDOH — ECONOMIC STABILITY: TRANSPORTATION INSECURITY: IN THE PAST 12 MONTHS, HAS LACK OF TRANSPORTATION KEPT YOU FROM MEDICAL APPOINTMENTS OR FROM GETTING MEDICATIONS?: NO

## 2025-04-12 SDOH — SOCIAL STABILITY: SOCIAL INSECURITY: DO YOU FEEL UNSAFE GOING BACK TO THE PLACE WHERE YOU ARE LIVING?: NO

## 2025-04-12 SDOH — SOCIAL STABILITY: SOCIAL INSECURITY: HAVE YOU HAD THOUGHTS OF HARMING ANYONE ELSE?: NO

## 2025-04-12 ASSESSMENT — PAIN DESCRIPTION - ORIENTATION: ORIENTATION: MID

## 2025-04-12 ASSESSMENT — ACTIVITIES OF DAILY LIVING (ADL)
GROOMING: INDEPENDENT
BATHING: INDEPENDENT
LACK_OF_TRANSPORTATION: NO
HEARING - LEFT EAR: FUNCTIONAL
TOILETING: INDEPENDENT
PATIENT'S MEMORY ADEQUATE TO SAFELY COMPLETE DAILY ACTIVITIES?: YES
DRESSING YOURSELF: INDEPENDENT
ADEQUATE_TO_COMPLETE_ADL: YES
JUDGMENT_ADEQUATE_SAFELY_COMPLETE_DAILY_ACTIVITIES: YES
EFFECT OF PAIN ON DAILY ACTIVITIES: DECREASED
WALKS IN HOME: INDEPENDENT
FEEDING YOURSELF: INDEPENDENT
HEARING - RIGHT EAR: FUNCTIONAL

## 2025-04-12 ASSESSMENT — COGNITIVE AND FUNCTIONAL STATUS - GENERAL
PATIENT BASELINE BEDBOUND: NO
DAILY ACTIVITIY SCORE: 24
MOBILITY SCORE: 24

## 2025-04-12 ASSESSMENT — PAIN SCALES - GENERAL
PAINLEVEL_OUTOF10: 0 - NO PAIN

## 2025-04-12 ASSESSMENT — LIFESTYLE VARIABLES
AUDIT-C TOTAL SCORE: 0
AUDIT-C TOTAL SCORE: 0
HOW OFTEN DO YOU HAVE A DRINK CONTAINING ALCOHOL: NEVER
SKIP TO QUESTIONS 9-10: 1
HOW MANY STANDARD DRINKS CONTAINING ALCOHOL DO YOU HAVE ON A TYPICAL DAY: PATIENT DOES NOT DRINK
HOW OFTEN DO YOU HAVE 6 OR MORE DRINKS ON ONE OCCASION: NEVER

## 2025-04-12 ASSESSMENT — COLUMBIA-SUICIDE SEVERITY RATING SCALE - C-SSRS
2. HAVE YOU ACTUALLY HAD ANY THOUGHTS OF KILLING YOURSELF?: NO
6. HAVE YOU EVER DONE ANYTHING, STARTED TO DO ANYTHING, OR PREPARED TO DO ANYTHING TO END YOUR LIFE?: NO
1. IN THE PAST MONTH, HAVE YOU WISHED YOU WERE DEAD OR WISHED YOU COULD GO TO SLEEP AND NOT WAKE UP?: NO

## 2025-04-12 ASSESSMENT — PAIN DESCRIPTION - PROGRESSION: CLINICAL_PROGRESSION: RESOLVED

## 2025-04-12 ASSESSMENT — PAIN DESCRIPTION - DIRECTION: RADIATING_TOWARDS: DENIES

## 2025-04-12 ASSESSMENT — PAIN - FUNCTIONAL ASSESSMENT
PAIN_FUNCTIONAL_ASSESSMENT: 0-10
PAIN_FUNCTIONAL_ASSESSMENT: 0-10

## 2025-04-12 ASSESSMENT — HEART SCORE
ECG: NORMAL
AGE: 45-64
HISTORY: HIGHLY SUSPICIOUS
RISK FACTORS: 1-2 RISK FACTORS
TROPONIN: LESS THAN OR EQUAL TO NORMAL LIMIT
HEART SCORE: 4

## 2025-04-12 ASSESSMENT — PAIN DESCRIPTION - PAIN TYPE: TYPE: ACUTE PAIN

## 2025-04-12 ASSESSMENT — PAIN DESCRIPTION - DESCRIPTORS
DESCRIPTORS: PRESSURE
DESCRIPTORS: PRESSURE;HEAVINESS

## 2025-04-12 ASSESSMENT — PATIENT HEALTH QUESTIONNAIRE - PHQ9
SUM OF ALL RESPONSES TO PHQ9 QUESTIONS 1 & 2: 0
2. FEELING DOWN, DEPRESSED OR HOPELESS: NOT AT ALL
1. LITTLE INTEREST OR PLEASURE IN DOING THINGS: NOT AT ALL

## 2025-04-12 ASSESSMENT — PAIN DESCRIPTION - LOCATION: LOCATION: CHEST

## 2025-04-12 ASSESSMENT — PAIN DESCRIPTION - FREQUENCY: FREQUENCY: INTERMITTENT

## 2025-04-12 ASSESSMENT — PAIN DESCRIPTION - ONSET: ONSET: SUDDEN

## 2025-04-12 NOTE — H&P
"   Medical Group History and Physical  ASSESSMENT & PLAN:   Cooper Gibbons \"Alirio\" is a 60 y.o. male admitted to [facility] for management of:    ACS rule out  - Had brief episode of chest pressure that lasted for ~ 10 minutes.  - hsTnT normal x2, EKG NSR with ST ST changes  - CXR unremarkable  - Negative drug screen  - Normal lipid panel  - TTE revealed normal LV systolic function, EF 62%.   - Telemetry    KILLIAN on CKD Stage 3a  - sCr 2.48 (baseline 1.7 - 1.9)  - Nephrology follow-up    Elevated LFTs  - AST 99, ALT 69, baseline is normal    Disposition: Anticipate discharge home this evening if TTE is normal, if not tomorrow.     I spent a total of 60 minutes on the date of the service which included preparing to see the patient, face-to-face patient care, completing clinical documentation, obtaining and/or reviewing separately obtained history, performing a medically appropriate examination, counseling and educating the patient/family/caregiver, ordering medications, tests, or procedures, independently interpreting results (not separately reported), and communicating results to the patient/family/caregiver.     Level of MDM:  Moderate   Risk: Moderate   Data Reviewed and/or Analyzed:   Included: Prior external notes from at least 1 unique source, Results, including laboratory findings and imaging reports, listed above, Orders and notes from all consultants involved, and Notes for this encounter.  I personally reviewed the tests referenced above.    The patient/family had opportunity to ask questions. All questions were answered to the best of my ability.    John White, [facility]    HISTORY OF PRESENT ILLNESS:   Chief Complaint: Chest Pressure    History Of Present Illness:    Cooper Gibbons is a 60 year old male with PMHx significant for CKD stage 3a 2/2 NSAID use, HTN, Type 2 DM, who presented to [facility] ED due to episode of chest pressure while at rest. Patient states he was resting watching tv " and all of a sudden he had intense chest pressure sensation. Pressure was located center of chest and did not radiate. Lasted for about 10 minutes. He also admits to experiencing nausea and diaphoresis during this episode of chest pressure. Denies experiencing chest pressure or pain in the past. Currently feels well and back to normal.     Review of systems: 10 point review of systems is otherwise negative except as mentioned above.    ED work-up: Afebrile, hemodynamically stable, VS WNL. On room air spo2 98%.  - hsTnT normal x2, CBC unremarkable. CMP revealed KILLIAN on CKD sCr 2.48 (baseline 1.7 - 1.9). Mildly elevated LFTs (AST 99, ALT 69). Negative urine drug screen.  - CXR revealed Bibasilar subsegmental atelectasis     PAST HISTORIES:     Past Medical History:  He has a past medical history of COVID-19 (02/21/2022) and Orthostatic hypotension (08/06/2020).    Past Surgical History:  He has a past surgical history that includes Ankle surgery (05/02/2018).      Social History:  He reports that he has never smoked. He has never used smokeless tobacco. He reports that he does not currently use alcohol. No history on file for drug use.    Family History:  Family History   Problem Relation Name Age of Onset    Other (esrd on dialysis) Mother      Diabetes Mother      Multiple sclerosis Mother          Allergies:  Patient has no known allergies.    OBJECTIVE:     Last Recorded Vitals:  Vitals:    04/12/25 1200 04/12/25 1301 04/12/25 1401 04/12/25 1415   BP: 128/73 127/75 131/85 137/86   BP Location:  Right arm Right arm Right arm   Patient Position:  Lying Lying Lying   Pulse: 69 73 72 73   Resp: 14 18 19 20   Temp:  36.7 °C (98.1 °F) 36.6 °C (97.9 °F) 35.9 °C (96.6 °F)   TempSrc:  Oral Oral Temporal   SpO2: 98% 98% 97% 99%   Weight:       Height:         Last I/O:  No intake/output data recorded.    Physical Exam  Vitals and nursing note reviewed.   Constitutional:       General: He is not in acute distress.      Appearance: Normal appearance.   HENT:      Mouth/Throat:      Mouth: Mucous membranes are moist.   Eyes:      Extraocular Movements: Extraocular movements intact.      Conjunctiva/sclera: Conjunctivae normal.   Cardiovascular:      Rate and Rhythm: Normal rate and regular rhythm.      Pulses: Normal pulses.      Heart sounds: Normal heart sounds. No murmur heard.  Pulmonary:      Effort: Pulmonary effort is normal. No respiratory distress.      Breath sounds: Normal breath sounds.   Abdominal:      General: Abdomen is flat. Bowel sounds are normal.      Palpations: Abdomen is soft.   Musculoskeletal:         General: Normal range of motion.      Right lower leg: No edema.      Left lower leg: No edema.   Skin:     General: Skin is warm.      Capillary Refill: Capillary refill takes less than 2 seconds.   Neurological:      General: No focal deficit present.      Mental Status: He is alert and oriented to person, place, and time. Mental status is at baseline.   Psychiatric:         Mood and Affect: Mood normal.         Behavior: Behavior normal.         Thought Content: Thought content normal.        Scheduled Medications  [START ON 4/13/2025] aspirin, 81 mg, oral, Daily      PRN Medications  PRN medications: acetaminophen  Continuous Medications       Outpatient Medications:  Prior to Admission medications    Medication Sig Start Date End Date Taking? Authorizing Provider   amLODIPine (Norvasc) 5 mg tablet Take 1 tablet (5 mg) by mouth once daily for 5 days. 4/11/25 4/16/25 Yes Ante T Pletikosic, DO   atorvastatin (Lipitor) 40 mg tablet Take 1 tablet (40 mg) by mouth once daily. 10/18/24  Yes Ante T Pletikosic, DO   blood sugar diagnostic (Accu-Chek Guide test strips) strip USE TO TEST TWICE DAILY IN THE  MORNING FASTING AND 1 TO 2 HOURS AFTER LARGEST MEAL 10/22/24  Yes Ante T Pletikosic, DO   cinnamon bark (CINNAMON ORAL) Take by mouth once daily. [place]   Yes Historical Provider, MD   losartan (Cozaar) 100 mg  tablet Take 1 tablet (100 mg) by mouth once daily. 10/18/24  Yes Ante T Pletikosic, DO   lancets (Accu-Chek Fastclix Lancet Drum) misc CHECK BLOOD SUGAR TWICE DAILY 10/22/24   Ante T Pletikosic, DO   multivitamin with minerals tablet Take 1 tablet by mouth once daily. 10/26/20   Historical Provider, MD   needles, disposable (BD INTRADERMAL BEVEL NEEDLES MISC) Micro 32Gx 6mm; use as directed for once daily injection    Historical Provider, MD   NON FORMULARY Alcohol swabs: use as directed    Historical Provider, MD   omeprazole (PriLOSEC) 20 mg DR capsule TAKE 1 CAPSULE BY MOUTH ONCE  DAILY IN THE MORNING TAKE BEFORE A MEAL  Patient taking differently: if needed. 11/1/24   Ante T Pletikosic, DO   Ozempic 2 mg/dose (8 mg/3 mL) pen injector INJECT SUBCUTANEOUSLY 2 MG EVERY WEEK 3/14/25   Ante T Pletikosic, DO   amLODIPine (Norvasc) 5 mg tablet Take 1 tablet (5 mg) by mouth once daily. 10/18/24 4/11/25  Ante T Pletikosic, DO       LABS AND IMAGING:     Labs:  Results from last 7 days   Lab Units 04/12/25  1007   WBC AUTO x10*3/uL 8.2   RBC AUTO x10*6/uL 4.51   HEMOGLOBIN g/dL 13.7   HEMATOCRIT % 42.2   MCV fL 94   MCH pg 30.4   MCHC g/dL 32.5   RDW % 13.2   PLATELETS AUTO x10*3/uL 301     Results from last 7 days   Lab Units 04/12/25  1007   SODIUM mmol/L 140   POTASSIUM mmol/L 4.4   CHLORIDE mmol/L 108*   CO2 mmol/L 25   BUN mg/dL 37*   CREATININE mg/dL 2.48*   GLUCOSE mg/dL 196*   PROTEIN TOTAL g/dL 7.0   CALCIUM mg/dL 9.3   BILIRUBIN TOTAL mg/dL 0.8   ALK PHOS U/L 106   AST U/L 99*   ALT U/L 69*     Results from last 7 days   Lab Units 04/12/25  1007   MAGNESIUM mg/dL 1.97     Results from last 7 days   Lab Units 04/12/25  1116 04/12/25  1007   TROPHS ng/L 4 3       Imaging:  XR chest 2 views  Narrative: STUDY:  Chest Radiographs; 04/12/2025 10:23 AM  INDICATION:  Chest pain, shortness of breath.  COMPARISON:  None.  ACCESSION NUMBER(S):  [id number]  ORDERING CLINICIAN:  RICCI BARRIGA  TECHNIQUE:  Frontal and  lateral chest.   FINDINGS:  CARDIOMEDIASTINAL SILHOUETTE:  Cardiomediastinal silhouette is normal in size and configuration.     LUNGS:  There are interstitial changes in both lung bases most likely  representing subsegmental atelectasis.     ABDOMEN:  No remarkable upper abdominal findings.     BONES:  No acute osseous changes.  Impression: Bibasilar subsegmental atelectasis.  Signed by Erickson Santoro MD

## 2025-04-12 NOTE — CARE PLAN
"The patient's goals for the shift include  \"figure out what's wrong with me\"    The clinical goals for the shift include all cardiac biomarkers will be WNL, CP will be <4/10 and non-radiating      Problem: Diabetes  Goal: Achieve decreasing blood glucose levels by end of shift  Outcome: Progressing  Goal: Increase stability of blood glucose readings by end of shift  Outcome: Progressing  Goal: Decrease in ketones present in urine by end of shift  Outcome: Progressing  Goal: Maintain electrolyte levels within acceptable range throughout shift  Outcome: Progressing  Goal: Maintain glucose levels >70mg/dl to <250mg/dl throughout shift  Outcome: Progressing  Goal: No changes in neurological exam by end of shift  Outcome: Progressing  Goal: Learn about and adhere to nutrition recommendations by end of shift  Outcome: Progressing  Goal: Vital signs within normal range for age by end of shift  Outcome: Progressing  Goal: Increase self care and/or family involovement by end of shift  Outcome: Progressing  Goal: Receive DSME education by end of shift  Outcome: Progressing     Problem: Pain - Adult  Goal: Verbalizes/displays adequate comfort level or baseline comfort level  Outcome: Progressing     Problem: Safety - Adult  Goal: Free from fall injury  Outcome: Progressing     Problem: Discharge Planning  Goal: Discharge to home or other facility with appropriate resources  Outcome: Progressing     Problem: Chronic Conditions and Co-morbidities  Goal: Patient's chronic conditions and co-morbidity symptoms are monitored and maintained or improved  Outcome: Progressing     Problem: Nutrition  Goal: Nutrient intake appropriate for maintaining nutritional needs  Outcome: Progressing     "

## 2025-04-12 NOTE — ED PROVIDER NOTES
Emergency Department Provider Note        History of Present Illness     History provided by: Patient  Limitations to History: None  External Records Reviewed with Brief Summary: None    HPI:  Cooper Gibbons is a 60 y.o. male PMH of CKD, HTN, HLD presenting with a chief complaint of chest pain.  Patient states this morning he was watching TV when he had acute onset chest pain.  Patient states it felt like 500 pounds with sitting on his chest.  He states that he had severe diaphoresis that he is never felt before, stating that it was hard for EMS to even place the stickers on his chest due to the amount of sweat.  Patient states that this chest pain is now resolved since arriving to the emergency department.  He states he has never had pain like this before.  Patient denies any preceding events in the last few days including decreased p.o. intake, nausea vomiting diarrhea fevers or chills.  Patient states now he feels like he is back to his baseline.  He states that he had previously a cardiac stress test but thinks this has been 5+ years ago.  Patient also endorses that he does not do well in the heat, stating that he frequently feels lightheaded and dizzy.  He states he has previously had 1 episode of syncope in that time he did not have any preceding prodrome.  He has never had this syncopal episode worked up    Physical Exam   Triage vitals:  T 36.7 °C (98 °F)  HR 70  /75  RR 18  O2 98 % None (Room air)    General: Awake, alert, in no acute distress  Eyes: Gaze conjugate.  No scleral icterus or injection  HENT: Normo-cephalic, atraumatic. No stridor  CV: Regular rate, regular rhythm. Radial pulses 2+ bilaterally  Resp: Breathing non-labored, speaking in full sentences.  Clear to auscultation bilaterally  GI: Soft, non-distended, non-tender. No rebound or guarding.  MSK/Extremities: No gross bony deformities. Moving all extremities.  No lower extremity edema.  Skin: Warm. Appropriate color  Neuro:  Alert. Oriented. Face symmetric. Speech is fluent.  Gross strength and sensation intact in b/l UE and LEs  Psych: Appropriate mood and affect    Medical Decision Making & ED Course   Medical Decision Makin y.o. male above-stated past medical history presenting with a chief complaint of chest pain.  On arrival patient is hemodynamically stable, afebrile satting well on room air no acute distress.  EKG documented upon arrival which showed no signs of ischemia.  Patient is endorsing that the chest pain has not resolved however his initial story is very concerning for cardiac etiology.  IV inserted, labs obtained including a CBC without any abnormalities, CMP with an increase in his creatinine to 2.48 from baseline, also similarly increased AST and ALT.  Initial troponin reassuring at 3.  Will get a delta troponin.  Initial heart score 4.  Repeat troponin is reassuring at 4.  Given the patient's elevated heart score, history of syncope that has never been evaluated and concerning chest pain story recommended observation for further provocative testing which patient is in agreement with.  Patient will be admitted under Dr. White in stable condition.   ----      Differential diagnoses considered include but are not limited to: Pneumonia, acute coronary syndrome, pulmonary embolism, aortic dissection/rupture, costochondritis, pneumothorax       Social Determinants of Health which Significantly Impact Care: None identified     EKG Independent Interpretation: EKG interpreted by myself. Please see ED Course for full interpretation.    Independent Result Review and Interpretation: Relevant laboratory and radiographic results were reviewed and independently interpreted by myself.  As necessary, they are commented on in the ED Course.    Chronic conditions affecting the patient's care: As documented above in MDM    The patient was discussed with the following consultants/services: None    Care Considerations: As  documented above in MDM    ED Course:  ED Course as of 04/12/25 1228   Sat Apr 12, 2025   1022 ECG 12 Lead  EKG demonstrates normal sinus rhythm with a rate of 74 bpm.  Normal MS and QTc.  No ST elevations or depressions concerning for ischemia.  No notable T wave inversions.  No priors available for comparison. [AW]   1045 Comprehensive metabolic panel(!)  KILLIAN on CKD [AW]   1053 Troponin I, High Sensitivity: 3 [AW]   1158 Troponin I, High Sensitivity: 4 [AW]      ED Course User Index  [AW] Jacklyn Solis DO         Diagnoses as of 04/12/25 1228   Chest pain, unspecified type   KILLIAN (acute kidney injury)     Disposition   As a result of their workup, the patient will require admission to the hospital.  The patient was informed of his diagnosis.  The patient was given the opportunity to ask questions and I answered them. The patient agreed to be admitted to the hospital.    Procedures   Procedures    Patient seen and discussed with ED attending physician.    Jacklyn Solis DO  Emergency Medicine            Jacklyn Solis DO  Resident  04/12/25 1230

## 2025-04-12 NOTE — ED NOTES
Report given to Boo, nurse Obs unit. Hospital transport requested.     Sonu Lemons RN  04/12/25 1347

## 2025-04-12 NOTE — ED TRIAGE NOTES
Pt arriving from home via EMS, pt reports sitting at home watching TV approx 30-45 min ago when he suddenly felt severe sternal non-radiating constant pressure-like CP w/ SOB, nausea, and diaphoresis for approx 10 min. Pt states S/Sx has since resolved.

## 2025-04-12 NOTE — DISCHARGE SUMMARY
Discharge Diagnosis  Chest pain  KILLIAN on CKD Stage 3    Issues Requiring Follow-Up  Cardiology follow-up. May benefit from cardiac ischemic work-up  Nephrology follow-up      Discharge Meds     Medication List      CHANGE how you take these medications     omeprazole 20 mg DR capsule; Commonly known as: PriLOSEC; TAKE 1 CAPSULE   BY MOUTH ONCE  DAILY IN THE MORNING TAKE BEFORE A MEAL; What changed: See   the new instructions.     CONTINUE taking these medications     Accu-Chek Guide test strips; Generic drug: blood sugar diagnostic; USE   TO TEST TWICE DAILY IN THE  MORNING FASTING AND 1 TO 2 HOURS AFTER LARGEST   MEAL   amLODIPine 5 mg tablet; Commonly known as: Norvasc; Take 1 tablet (5 mg)   by mouth once daily for 5 days.   atorvastatin 40 mg tablet; Commonly known as: Lipitor; Take 1 tablet (40   mg) by mouth once daily.   BD INTRADERMAL BEVEL NEEDLES MISC   CINNAMON ORAL   lancets misc; Commonly known as: Accu-Chek Fastclix Lancet Drum; CHECK   BLOOD SUGAR TWICE DAILY   losartan 100 mg tablet; Commonly known as: Cozaar; Take 1 tablet (100   mg) by mouth once daily.   multivitamin with minerals tablet   NON FORMULARY   Ozempic 2 mg/dose (8 mg/3 mL) pen injector; Generic drug: semaglutide;   INJECT SUBCUTANEOUSLY 2 MG EVERY WEEK       Test Results Pending At Discharge  Pending Labs       Order Current Status    Cystatin C with Estimated GFR In process            Hospital Course  Cooper Gibbons is a 60 year old male with PMHx significant for CKD stage 3a 2/2 NSAID use, HTN, Type 2 DM, who presented to [facility] ED due to episode of chest pressure while at rest. Admitted for ACS rule out.    ED work-up: Afebrile, hemodynamically stable, VS WNL. On room air spo2 98%.  - hsTnT normal x2, CBC unremarkable. CMP revealed KILLIAN on CKD sCr 2.48 (baseline 1.7 - 1.9). Mildly elevated LFTs (AST 99, ALT 69). Negative urine drug screen.  - CXR revealed Bibasilar subsegmental atelectasis     TTE revealed normal LV systolic  function, EF 62%.  Patient remained hemodynamically stable throughout this hospitalization and did not have reoccurrence of chest pressure. He was discharged home this evening. Instructed to follow-up with cardiology and nephrology.    > 30min in discharge coordination of care which includes: discharge planning, medication reconciliation, arranging appropriate follow up and discussion of discharge instructions with the patient.    Pertinent Physical Exam At Time of Discharge  Physical Exam  Vitals and nursing note reviewed.   Constitutional:       General: He is not in acute distress.     Appearance: Normal appearance.   HENT:      Mouth/Throat:      Mouth: Mucous membranes are moist.   Eyes:      Extraocular Movements: Extraocular movements intact.      Conjunctiva/sclera: Conjunctivae normal.   Cardiovascular:      Rate and Rhythm: Normal rate and regular rhythm.      Pulses: Normal pulses.      Heart sounds: Normal heart sounds. No murmur heard.  Pulmonary:      Effort: Pulmonary effort is normal. No respiratory distress.      Breath sounds: Normal breath sounds.   Abdominal:      General: Abdomen is flat. Bowel sounds are normal.      Palpations: Abdomen is soft.   Musculoskeletal:         General: Normal range of motion.      Right lower leg: No edema.      Left lower leg: No edema.   Skin:     General: Skin is warm.      Capillary Refill: Capillary refill takes less than 2 seconds.   Neurological:      General: No focal deficit present.      Mental Status: He is alert and oriented to person, place, and time. Mental status is at baseline.   Psychiatric:         Mood and Affect: Mood normal.         Behavior: Behavior normal.         Thought Content: Thought content normal.         Outpatient Follow-Up  Future Appointments   Date Time Provider Department [place]   4/16/2025  1:00 PM Ellie Mack DO [id number] [facility]   3/16/2026  9:40 AM Ingris Mendez MD [id number] [facility]         John White DO

## 2025-04-12 NOTE — ED NOTES
Pt transported to  118 by hospital transporter on ED stretcher with safety/fall precautions, mask on, belongings, paperwork, family. No significant detrimental changes prior to transfer. Neuro/skin/respiratory grossly WDL.      Sonu Lemons RN  04/12/25 [id number]

## 2025-04-12 NOTE — NURSING NOTE
Patient discharged per provider order. All LDAs discontinued, patient tolerated well. Writer reviewed discharge information and reinforced patient education, patient verbalized understanding. All personal belongings returned to patient. Pt left with wife via own vehicle on RA --- Boo Santana RN

## 2025-04-14 LAB
ATRIAL RATE: 74 BPM
P AXIS: -18 DEGREES
P OFFSET: 161 MS
P ONSET: 125 MS
PR INTERVAL: 204 MS
Q ONSET: 227 MS
QRS COUNT: 13 BEATS
QRS DURATION: 94 MS
QT INTERVAL: 376 MS
QTC CALCULATION(BAZETT): 417 MS
QTC FREDERICIA: 403 MS
R AXIS: 8 DEGREES
T AXIS: -1 DEGREES
T OFFSET: 415 MS
VENTRICULAR RATE: 74 BPM

## 2025-04-15 ENCOUNTER — OFFICE VISIT (OUTPATIENT)
Dept: PRIMARY CARE | Facility: CLINIC | Age: 60
End: 2025-04-15
Payer: COMMERCIAL

## 2025-04-15 VITALS
WEIGHT: 279 LBS | HEIGHT: 75 IN | DIASTOLIC BLOOD PRESSURE: 80 MMHG | OXYGEN SATURATION: 97 % | SYSTOLIC BLOOD PRESSURE: 124 MMHG | BODY MASS INDEX: 34.69 KG/M2 | HEART RATE: 92 BPM

## 2025-04-15 DIAGNOSIS — Z13.29 SCREENING FOR THYROID DISORDER: ICD-10-CM

## 2025-04-15 DIAGNOSIS — N18.30 TYPE 2 DM WITH CKD STAGE 3 AND HYPERTENSION (MULTI): ICD-10-CM

## 2025-04-15 DIAGNOSIS — E11.22 TYPE 2 DM WITH CKD STAGE 3 AND HYPERTENSION (MULTI): ICD-10-CM

## 2025-04-15 DIAGNOSIS — E78.5 HYPERLIPIDEMIA, UNSPECIFIED HYPERLIPIDEMIA TYPE: ICD-10-CM

## 2025-04-15 DIAGNOSIS — Z13.1 SCREENING FOR DIABETES MELLITUS: ICD-10-CM

## 2025-04-15 DIAGNOSIS — I10 PRIMARY HYPERTENSION: ICD-10-CM

## 2025-04-15 DIAGNOSIS — I12.9 TYPE 2 DM WITH CKD STAGE 3 AND HYPERTENSION (MULTI): ICD-10-CM

## 2025-04-15 DIAGNOSIS — E66.01 SEVERE OBESITY (BMI 35.0-39.9) WITH COMORBIDITY (MULTI): ICD-10-CM

## 2025-04-15 DIAGNOSIS — Z09 HOSPITAL DISCHARGE FOLLOW-UP: Primary | ICD-10-CM

## 2025-04-15 DIAGNOSIS — Z13.0 SCREENING FOR DISORDER OF BLOOD AND BLOOD-FORMING ORGANS: ICD-10-CM

## 2025-04-15 LAB
CREAT SERPL-MCNC: 2.57 MG/DL (ref 0.69–1.22)
CYSTATIN C SERPL-MCNC: 1.79 MG/L (ref 0.61–0.95)
EGFRCR-CYS SERPLBLD CKD-EPI 2021: 32 ML/MIN/{1.73_M2}

## 2025-04-15 PROCEDURE — 3079F DIAST BP 80-89 MM HG: CPT | Performed by: STUDENT IN AN ORGANIZED HEALTH CARE EDUCATION/TRAINING PROGRAM

## 2025-04-15 PROCEDURE — 3048F LDL-C <100 MG/DL: CPT | Performed by: STUDENT IN AN ORGANIZED HEALTH CARE EDUCATION/TRAINING PROGRAM

## 2025-04-15 PROCEDURE — 1036F TOBACCO NON-USER: CPT | Performed by: STUDENT IN AN ORGANIZED HEALTH CARE EDUCATION/TRAINING PROGRAM

## 2025-04-15 PROCEDURE — 4010F ACE/ARB THERAPY RXD/TAKEN: CPT | Performed by: STUDENT IN AN ORGANIZED HEALTH CARE EDUCATION/TRAINING PROGRAM

## 2025-04-15 PROCEDURE — 99214 OFFICE O/P EST MOD 30 MIN: CPT | Performed by: STUDENT IN AN ORGANIZED HEALTH CARE EDUCATION/TRAINING PROGRAM

## 2025-04-15 PROCEDURE — 3008F BODY MASS INDEX DOCD: CPT | Performed by: STUDENT IN AN ORGANIZED HEALTH CARE EDUCATION/TRAINING PROGRAM

## 2025-04-15 PROCEDURE — 3074F SYST BP LT 130 MM HG: CPT | Performed by: STUDENT IN AN ORGANIZED HEALTH CARE EDUCATION/TRAINING PROGRAM

## 2025-04-15 PROCEDURE — 99496 TRANSJ CARE MGMT HIGH F2F 7D: CPT | Performed by: STUDENT IN AN ORGANIZED HEALTH CARE EDUCATION/TRAINING PROGRAM

## 2025-04-15 RX ORDER — LOSARTAN POTASSIUM 100 MG/1
100 TABLET ORAL DAILY
Qty: 90 TABLET | Refills: 1 | Status: SHIPPED | OUTPATIENT
Start: 2025-04-15

## 2025-04-15 RX ORDER — SEMAGLUTIDE 2.68 MG/ML
2 INJECTION, SOLUTION SUBCUTANEOUS WEEKLY
Qty: 9 ML | Refills: 1 | Status: SHIPPED | OUTPATIENT
Start: 2025-04-15

## 2025-04-15 RX ORDER — AMLODIPINE BESYLATE 5 MG/1
5 TABLET ORAL DAILY
Qty: 90 TABLET | Refills: 1 | Status: SHIPPED | OUTPATIENT
Start: 2025-04-15

## 2025-04-15 RX ORDER — ATORVASTATIN CALCIUM 40 MG/1
40 TABLET, FILM COATED ORAL DAILY
Qty: 90 TABLET | Refills: 1 | Status: CANCELLED | OUTPATIENT
Start: 2025-04-15

## 2025-04-15 ASSESSMENT — PATIENT HEALTH QUESTIONNAIRE - PHQ9
SUM OF ALL RESPONSES TO PHQ9 QUESTIONS 1 AND 2: 0
1. LITTLE INTEREST OR PLEASURE IN DOING THINGS: NOT AT ALL
2. FEELING DOWN, DEPRESSED OR HOPELESS: NOT AT ALL

## 2025-04-15 NOTE — PROGRESS NOTES
"Subjective   Patient ID: Cooper Gibbons \"Alirio\" is a 60 y.o. male who presents for Annual Exam.  Today he is accompanied by alone.     HPI    1.  Hospital Follow up  Patient presented to [facility] ED due to episode of chest pressure while at rest. Admitted for ACS rule out.  During his stay he did have an KILLIAN on CKD with a serum creatinine of 2.48, and mildly elevated LFTs.  Checks x-ray done revealed bibasilar segmental atelectasis, an echo was also done revealing normal left ventricular systolic function as well as EF of 62%.  He remained hemodynamically stable throughout the hospitalization, his chest pain did not recur and he was discharged with cardiology and nephrology follow up.  Not complaining of any cardiac symptoms at this time.  Follows with Dr. Mendez for nephrology, looking for recommendations for cardiologist.    2. Hypertension  Continues to take losartan 100 mg daily as indicated in the chart alongside 5 mg daily  He has been doing very well with this medication  Blood pressure well within normal limits  Denies any cardiopulmonary symptoms  Requesting refills to his mail out pharmacy      3. Diabetes mellitus with CKD 3  Continues to take Ozempic as indicated in the chart that 2.0 mg once weekly  Most recent hemoglobin A1c was noted at 6.9%  Has been on Ozempic for the past 2 to 3 years, he starting weight was 330 pounds  Feels well and requesting refills to be sent out to his mail out pharmacy  Has a history of now being down over 60 pounds due to the medication  Recently followed up with nephrology in September who is continue to monitor him every 6 months    4.  GERD  Continues to take omeprazole 20-40 mg on an as-needed basis  Feels very well in regards to his acid reflux  Uses this very rarely and feels well     5. Hyperlipidemia  Continues to take atorvastatin 40 mg daily as indicated in chart  Cholesterol appears to be stable as indicated the chart  Denies any myalgias  Requesting refills " to his mail out pharmacy  CT cardiac score was performed and showed a value of 0    Current Outpatient Medications on File Prior to Visit   Medication Sig Dispense Refill    amLODIPine (Norvasc) 5 mg tablet Take 1 tablet (5 mg) by mouth once daily for 5 days. 5 tablet 0    atorvastatin (Lipitor) 40 mg tablet Take 1 tablet (40 mg) by mouth once daily. 90 tablet 1    blood sugar diagnostic (Accu-Chek Guide test strips) strip USE TO TEST TWICE DAILY IN THE  MORNING FASTING AND 1 TO 2 HOURS AFTER LARGEST MEAL 200 strip 3    cinnamon bark (CINNAMON ORAL) Take by mouth once daily. Moran      lancets (Accu-Chek Fastclix Lancet Drum) misc CHECK BLOOD SUGAR TWICE DAILY 204 each 3    losartan (Cozaar) 100 mg tablet Take 1 tablet (100 mg) by mouth once daily. 90 tablet 1    multivitamin with minerals tablet Take 1 tablet by mouth once daily.      needles, disposable (BD INTRADERMAL BEVEL NEEDLES MISC) Micro 32Gx 6mm; use as directed for once daily injection      NON FORMULARY Alcohol swabs: use as directed      omeprazole (PriLOSEC) 20 mg DR capsule TAKE 1 CAPSULE BY MOUTH ONCE  DAILY IN THE MORNING TAKE BEFORE A MEAL (Patient taking differently: if needed.) 90 capsule 1    Ozempic 2 mg/dose (8 mg/3 mL) pen injector INJECT SUBCUTANEOUSLY 2 MG EVERY WEEK 9 mL 0    [DISCONTINUED] amLODIPine (Norvasc) 5 mg tablet Take 1 tablet (5 mg) by mouth once daily. 90 tablet 1     No current facility-administered medications on file prior to visit.        No Known Allergies    Immunization History   Administered Date(s) Administered    Flu vaccine (IIV4), preservative free *Check age/dose* 01/31/2018, 10/29/2018, 11/13/2020, 10/26/2022, 11/10/2023    Flu vaccine, trivalent, preservative free, age 6 months and greater (Fluarix/Fluzone/Flulaval) 10/18/2024    Hep A, Unspecified 11/22/2013    Hepatitis B vaccine, adult *Check Product/Dose* 11/22/2013, 01/15/2014, 05/06/2014    Influenza, seasonal, injectable 10/26/2022    Pfizer Gray Cap  "SARS-CoV-2 04/09/2022    Pfizer Purple Cap SARS-CoV-2 05/01/2021, 05/22/2021    Pneumococcal conjugate vaccine, 20-valent (PREVNAR 20) 11/10/2023    Pneumococcal polysaccharide vaccine, 23-valent, age 2 years and older (PNEUMOVAX 23) 09/01/2021    SARS-CoV-2, Unspecified 04/09/2022    Tdap vaccine, age 7 year and older (BOOSTRIX, ADACEL) 08/19/2011, 06/05/2022    Zoster vaccine, recombinant, adult (SHINGRIX) 10/26/2022, 03/23/2023         Review of Systems  All pertinent positive symptoms are included in the history of present illness.  All other systems have been reviewed and are negative and noncontributory to this patient's current ailments.     Objective   /80 (BP Location: Left arm, Patient Position: Sitting, BP Cuff Size: Large adult)   Pulse 92   Ht 1.905 m (6' 3\")   Wt 127 kg (279 lb)   SpO2 97%   BMI 34.87 kg/m²   BSA: 2.59 meters squared  Admission on 04/12/2025, Discharged on 04/12/2025   Component Date Value Ref Range Status    Ventricular Rate 04/12/2025 74  BPM Final    Atrial Rate 04/12/2025 74  BPM Final    MT Interval 04/12/2025 204  ms Final    QRS Duration 04/12/2025 94  ms Final    QT Interval 04/12/2025 376  ms Final    QTC Calculation(Bazett) 04/12/2025 417  ms Final    P Axis 04/12/2025 -18  degrees Final    R Axis 04/12/2025 8  degrees Final    T Axis 04/12/2025 -1  degrees Final    QRS Count 04/12/2025 13  beats Final    Q Onset 04/12/2025 227  ms Final    P Onset 04/12/2025 125  ms Final    P Offset 04/12/2025 161  ms Final    T Offset 04/12/2025 415  ms Final    QTC Fredericia 04/12/2025 403  ms Final    WBC 04/12/2025 8.2  4.4 - 11.3 x10*3/uL Final    nRBC 04/12/2025 0.0  0.0 - 0.0 /100 WBCs Final    RBC 04/12/2025 4.51  4.50 - 5.90 x10*6/uL Final    Hemoglobin 04/12/2025 13.7  13.5 - 17.5 g/dL Final    Hematocrit 04/12/2025 42.2  41.0 - 52.0 % Final    MCV 04/12/2025 94  80 - 100 fL Final    MCH 04/12/2025 30.4  26.0 - 34.0 pg Final    MCHC 04/12/2025 32.5  32.0 - 36.0 g/dL " Final    RDW 04/12/2025 13.2  11.5 - 14.5 % Final    Platelets 04/12/2025 301  150 - 450 x10*3/uL Final    Neutrophils % 04/12/2025 71.1  40.0 - 80.0 % Final    Immature Granulocytes %, Automated 04/12/2025 0.5  0.0 - 0.9 % Final    Immature Granulocyte Count (IG) includes promyelocytes, myelocytes and metamyelocytes but does not include bands. Percent differential counts (%) should be interpreted in the context of the absolute cell counts (cells/UL).    Lymphocytes % 04/12/2025 21.3  13.0 - 44.0 % Final    Monocytes % 04/12/2025 6.4  2.0 - 10.0 % Final    Eosinophils % 04/12/2025 0.5  0.0 - 6.0 % Final    Basophils % 04/12/2025 0.2  0.0 - 2.0 % Final    Neutrophils Absolute 04/12/2025 5.81  1.20 - 7.70 x10*3/uL Final    Percent differential counts (%) should be interpreted in the context of the absolute cell counts (cells/uL).    Immature Granulocytes Absolute, Au* 04/12/2025 0.04  0.00 - 0.70 x10*3/uL Final    Lymphocytes Absolute 04/12/2025 1.74  1.20 - 4.80 x10*3/uL Final    Monocytes Absolute 04/12/2025 0.52  0.10 - 1.00 x10*3/uL Final    Eosinophils Absolute 04/12/2025 0.04  0.00 - 0.70 x10*3/uL Final    Basophils Absolute 04/12/2025 0.02  0.00 - 0.10 x10*3/uL Final    Magnesium 04/12/2025 1.97  1.60 - 2.40 mg/dL Final    Glucose 04/12/2025 196 (H)  74 - 99 mg/dL Final    Sodium 04/12/2025 140  136 - 145 mmol/L Final    Potassium 04/12/2025 4.4  3.5 - 5.3 mmol/L Final    Chloride 04/12/2025 108 (H)  98 - 107 mmol/L Final    Bicarbonate 04/12/2025 25  21 - 32 mmol/L Final    Anion Gap 04/12/2025 11  10 - 20 mmol/L Final    Urea Nitrogen 04/12/2025 37 (H)  6 - 23 mg/dL Final    Creatinine 04/12/2025 2.48 (H)  0.50 - 1.30 mg/dL Final    eGFR 04/12/2025 29 (L)  >60 mL/min/1.73m*2 Final    Calculations of estimated GFR are performed using the 2021 CKD-EPI Study Refit equation without the race variable for the IDMS-Traceable creatinine methods.  https://jasn.asnjournals.org/content/early/2021/09/22/ASN.0129320190     Calcium 04/12/2025 9.3  8.6 - 10.3 mg/dL Final    Albumin 04/12/2025 4.1  3.4 - 5.0 g/dL Final    Alkaline Phosphatase 04/12/2025 106  33 - 136 U/L Final    Total Protein 04/12/2025 7.0  6.4 - 8.2 g/dL Final    AST 04/12/2025 99 (H)  9 - 39 U/L Final    Bilirubin, Total 04/12/2025 0.8  0.0 - 1.2 mg/dL Final    ALT 04/12/2025 69 (H)  10 - 52 U/L Final    Patients treated with Sulfasalazine may generate falsely decreased results for ALT.    Troponin I, High Sensitivity 04/12/2025 3  0 - 20 ng/L Final    Troponin I, High Sensitivity 04/12/2025 4  0 - 20 ng/L Final    POCT Glucose 04/12/2025 192 (H)  74 - 99 mg/dL Final    Color, Urine 04/12/2025 Light-Yellow  Light-Yellow, Yellow, Dark-Yellow Final    Appearance, Urine 04/12/2025 Clear  Clear Final    Specific Gravity, Urine 04/12/2025 1.010  1.005 - 1.035 Final    pH, Urine 04/12/2025 5.5  5.0, 5.5, 6.0, 6.5, 7.0, 7.5, 8.0 Final    Protein, Urine 04/12/2025 NEGATIVE  NEGATIVE, 10 (TRACE), 20 (TRACE) mg/dL Final    Glucose, Urine 04/12/2025 Normal  Normal mg/dL Final    Blood, Urine 04/12/2025 NEGATIVE  NEGATIVE mg/dL Final    Ketones, Urine 04/12/2025 NEGATIVE  NEGATIVE mg/dL Final    Bilirubin, Urine 04/12/2025 NEGATIVE  NEGATIVE mg/dL Final    Urobilinogen, Urine 04/12/2025 Normal  Normal mg/dL Final    Nitrite, Urine 04/12/2025 NEGATIVE  NEGATIVE Final    Leukocyte Esterase, Urine 04/12/2025 NEGATIVE  NEGATIVE Final    Amphetamine Screen, Urine 04/12/2025 Presumptive Negative  Presumptive Negative Final    CUTOFF LEVEL: 500 NG/ML   Cross-reactivity has been reported with high concentrations   of the following drugs: buproprion, chloroquine, chlorpromazine,   ephedrine, mephentermine, fenfluramine, phentermine,   phenylpropanolamine, pseudoephedrine, and propranolol.    Barbiturate Screen, Urine 04/12/2025 Presumptive Negative  Presumptive Negative Final    CUTOFF LEVEL: 200 NG/ML    Benzodiazepines Screen, Urine 04/12/2025 Presumptive Negative  Presumptive Negative  Final    CUTOFF LEVEL: 200 NG/ML    Cannabinoid Screen, Urine 04/12/2025 Presumptive Negative  Presumptive Negative Final    CUTOFF LEVEL: 50 NG/ML    Cocaine Metabolite Screen, Urine 04/12/2025 Presumptive Negative  Presumptive Negative Final    CUTOFF LEVEL: 150 NG/ML    Fentanyl Screen, Urine 04/12/2025 Presumptive Negative  Presumptive Negative Final    CUTOFF LEVEL: 5 NG/ML    Opiate Screen, Urine 04/12/2025 Presumptive Negative  Presumptive Negative Final    CUTOFF LEVEL: 300 NG/ML  The opiate screen does not detect fentanyl, meperidine, or   tramadol. Oxycodone is not consistently detected (refer to  Oxycodone Screen, Urine result).    Oxycodone Screen, Urine 04/12/2025 Presumptive Negative  Presumptive Negative Final    CUTOFF LEVEL: 100 NG/ML  This test will accurately detect both oxycodone and oxymorphone.    PCP Screen, Urine 04/12/2025 Presumptive Negative  Presumptive Negative Final    CUTOFF LEVEL:  25 NG/ML  Cross-reactivity has been reported with dextromethorphan.    Methadone Screen, Urine 04/12/2025 Presumptive Negative  Presumptive Negative Final    CUTOFF LEVEL: 150 NG/ML  The metabolite L-alpha-acetylmethadol (LAAM) is not  detected by this method in concentrations that would  be found in the urine of patients on LAAM therapy.    Cholesterol 04/12/2025 138  0 - 199 mg/dL Final          Age      Desirable   Borderline High   High     0-19 Y     0 - 169       170 - 199     >/= 200    20-24 Y     0 - 189       190 - 224     >/= 225         >24 Y     0 - 199       200 - 239     >/= 240   **All ranges are based on fasting samples. Specific   therapeutic targets will vary based on patient-specific   cardiac risk.    Pediatric guidelines reference:Pediatrics 2011, 128(S5).Adult guidelines reference: NCEP ATPIII Guidelines,ARLETTE 2001, 258:2486-97    Venipuncture immediately after or during the administration of Metamizole may lead to falsely low results. Testing should be performed immediately prior to  Metamizole dosing.    HDL-Cholesterol 04/12/2025 41.6  mg/dL Final      Age       Very Low   Low     Normal    High    0-19 Y    < 35      < 40     40-45     ----  20-24 Y    ----     < 40      >45      ----        >24 Y      ----     < 40     40-60      >60      Cholesterol/HDL Ratio 04/12/2025 3.3   Final      Ref Values  Desirable  < 3.4  High Risk  > 5.0    LDL Calculated 04/12/2025 75  <=99 mg/dL Final                                Near   Borderline      AGE      Desirable  Optimal    High     High     Very High     0-19 Y     0 - 109     ---    110-129   >/= 130     ----    20-24 Y     0 - 119     ---    120-159   >/= 160     ----      >24 Y     0 -  99   100-129  130-159   160-189     >/=190      VLDL 04/12/2025 21  0 - 40 mg/dL Final    Triglycerides 04/12/2025 105  0 - 149 mg/dL Final    Age              Desirable        Borderline         High        Very High  SEX:B           mg/dL             mg/dL               mg/dL      mg/dL  <=14D                       ----               ----        ----  15D-365D                    ----               ----        ----  1Y-9Y           0-74               75-99             >=100       ----  10Y-19Y        0-89                            >=130       ----  20Y-24Y        0-114             115-149             >=150      ----  >= 25Y         0-149             150-199             200-499    >=500      Venipuncture immediately after or during the administration of Metamizole may lead to falsely low results. Testing should be performed immediately prior to Metamizole dosing.    Non HDL Cholesterol 04/12/2025 96  0 - 149 mg/dL Final          Age       Desirable   Borderline High   High     Very High     0-19 Y     0 - 119       120 - 144     >/= 145    >/= 160    20-24 Y     0 - 149       150 - 189     >/= 190      ----         >24 Y    30 mg/dL above LDL Cholesterol goal      AV pk jigna 04/12/2025 1.45  m/s Final    AV mn grad 04/12/2025 5  mmHg Final     LVOT diam 04/12/2025 2.32  cm Final    MV E/A ratio 04/12/2025 1.11   Final    LA vol index A/L 04/12/2025 26.3  ml/m2 Final    Tricuspid annular plane systolic e* 04/12/2025 1.8  cm Final    LV EF 04/12/2025 62  % Final    RV free wall pk S' 04/12/2025 12.00  cm/s Final    LVIDd 04/12/2025 4.41  cm Final    RVSP 04/12/2025 21.3  mmHg Final    Aortic Valve Area by Continuity of* 04/12/2025 3.82  cm2 Final    Aortic Valve Area by Continuity of* 04/12/2025 4.04  cm2 Final    AV pk grad 04/12/2025 8  mmHg Final    LV A4C EF 04/12/2025 60.1   Final    POCT Glucose 04/12/2025 117 (H)  74 - 99 mg/dL Final    Cystatin C 04/12/2025 1.79 (H)  0.61 - 0.95 mg/L Final    Creatinine 04/12/2025 2.57 (H)  0.69 - 1.22 mg/dL Final    eGFR BY Cystatin C 04/12/2025 32 (L)  >=60 Final    Comment: INTERPRETIVE INFORMATION: Cystatin and Creatinine with eGFR    The estimated glomerular filtration rate (eGFR) was calculated   using the 2021 CKD-EPI eGFR creatinine-cystatin equation. This   equation is validated in individuals 18 years of age and older.   Accurate estimation of GFR requires stable day-to-day filtration   markers (creatinine and cystatin C). Filtration markers are   influenced by non-GFR determinants including generation from cells   and diet, tubular secretion and reabsorption, and extra-renal   elimination.  These determinants may affect eGFR accuracy. The   eGFR is normalized to a body surface area of 1.73 square meters.    GFR Categories in Chronic Kidney Disease (CKD)    GFR            GFR (mL/min/1.73  Category:      square meters):       Interpretation:  G1             90 or greater         Normal to high*  G2             60-89                 Mild decrease*  G3a            45-59                 Mild to moderate decrease  G3b            30-44                                            Moderate to severe decrease  G4             15-29                 Severe decrease  G5             14 or less            Kidney  failure    *In the absence of evidence of kidney damage, neither GFR category   G1 nor G2 fulfill the criteria for CKD (Kidney Int Suppl   2013;3:1-150)  Performed By: [facility]  [address] [place] [address]  : Mehdi Garcia MD, PhD  CLIA Number: [id number]   Orders Only on 03/17/2025   Component Date Value Ref Range Status    URIC ACID 03/17/2025 5.9  4.0 - 8.0 mg/dL Final    Comment: Therapeutic target for gout patients: <6.0 mg/dL          IRON, TOTAL 03/17/2025 89  50 - 180 mcg/dL Final    IRON BINDING CAPACITY 03/17/2025 329  250 - 425 mcg/dL (calc) Final    % SATURATION 03/17/2025 27  20 - 48 % (calc) Final    GLUCOSE 03/17/2025 68  65 - 99 mg/dL Final    Comment:               Fasting reference interval         UREA NITROGEN (BUN) 03/17/2025 30 (H)  7 - 25 mg/dL Final    CREATININE 03/17/2025 2.02 (H)  0.70 - 1.30 mg/dL Final    EGFR 03/17/2025 37 (L)  > OR = 60 mL/min/1.73m2 Final    BUN/CREATININE RATIO 03/17/2025 15  6 - 22 (calc) Final    SODIUM 03/17/2025 139  135 - 146 mmol/L Final    POTASSIUM 03/17/2025 4.6  3.5 - 5.3 mmol/L Final    CHLORIDE 03/17/2025 105  98 - 110 mmol/L Final    CARBON DIOXIDE 03/17/2025 23  20 - 32 mmol/L Final    CALCIUM 03/17/2025 9.5  8.6 - 10.3 mg/dL Final    PHOSPHATE (AS PHOSPHORUS) 03/17/2025 2.7  2.5 - 4.5 mg/dL Final    ALBUMIN 03/17/2025 4.5  3.6 - 5.1 g/dL Final    WHITE BLOOD CELL COUNT 03/17/2025 9.8  3.8 - 10.8 Thousand/uL Final    RED BLOOD CELL COUNT 03/17/2025 4.60  4.20 - 5.80 Million/uL Final    HEMOGLOBIN 03/17/2025 14.1  13.2 - 17.1 g/dL Final    HEMATOCRIT 03/17/2025 43.0  38.5 - 50.0 % Final    MCV 03/17/2025 93.5  80.0 - 100.0 fL Final    MCH 03/17/2025 30.7  27.0 - 33.0 pg Final    MCHC 03/17/2025 32.8  32.0 - 36.0 g/dL Final    Comment: For adults, a slight decrease in the calculated MCHC  value (in the range of 30 to 32 g/dL) is most likely  not clinically significant; however, it should be  interpreted with  caution in correlation with other  red cell parameters and the patient's clinical  condition.      RDW 03/17/2025 13.0  11.0 - 15.0 % Final    PLATELET COUNT 03/17/2025 313  140 - 400 Thousand/uL Final    MPV 03/17/2025 9.3  7.5 - 12.5 fL Final    FERRITIN 03/17/2025 173  38 - 380 ng/mL Final    PARATHYROID HORMONE, INTACT 03/17/2025 59  16 - 77 pg/mL Final    Comment:    Interpretive Guide    Intact PTH           Calcium  ------------------    ----------           -------  Normal Parathyroid    Normal               Normal  Hypoparathyroidism    Low or Low Normal    Low  Hyperparathyroidism     Primary            Normal or High       High     Secondary          High                 Normal or Low     Tertiary           High                 High  Non-Parathyroid     Hypercalcemia      Low or Low Normal    High         VITAMIN D,25-OH,TOTAL,IA 03/17/2025 38  30 - 100 ng/mL Final    Comment: Vitamin D Status         25-OH Vitamin D:     Deficiency:                    <20 ng/mL  Insufficiency:             20 - 29 ng/mL  Optimal:                 > or = 30 ng/mL     For 25-OH Vitamin D testing on patients on   D2-supplementation and patients for whom quantitation   of D2 and D3 fractions is required, the QuestAssureD(TM)  25-OH VIT D, (D2,D3), LC/MS/MS is recommended: order   code 96241 (patients >2yrs).     See Note 1     Note 1     For additional information, please refer to   http://education.Billingstreet/faq/GXZ410   (This link is being provided for informational/  educational purposes only.)         Physical Exam  CONSTITUTIONAL - well nourished, well developed, looks like stated age, in no acute distress, not ill-appearing, and not tired appearing  SKIN - normal skin color and pigmentation, normal skin turgor without rash, lesions, or nodules visualized  HEAD - no trauma, normocephalic  EYES - normal external exam  LUNG - clear to auscultation, no wheezing, no crackles and no rales, good effort  CARDIAC -  regular rate and regular rhythm, no skipped beats, no murmur  ABDOMEN - no organomegaly, soft, nontender, nondistended, normal bowel sounds  EXTREMITIES - no edema, no deformities  NEUROLOGICAL - normal balance, normal motor, no ataxia  PSYCHIATRIC - alert, pleasant and cordial, age-appropriate      Assessment/Plan   1.  Hospital admission follow-up  Continue following with nephrologist as scheduled, a referral has been given for Dr. Menezes for cardiology.  We recommend you schedule an appointment at your earliest mutual convenience.  If at anytime you develop symptoms of chest pain, chest pressure, palpitations please call the clinic or go to the emergency room immediately.    2. Hypertension  Stable without any changes recommended  Continue the losartan at 100 mg alongside amlodipine 5 mg  I would like to have you monitor and record blood pressures at home  Blood pressure goal should be below 130/80, ideally 120/80     3.  Diabetes mellitus with CKD 3  Continue Ozempic as currently prescribed at 2 mg once weekly  Hemoglobin A1c at the upper limit of normal for what is acceptable in my opinion at 6.9%  I would recommend diet and exercise at this time we will continue monitoring closely  Continue following up with a nephrologist per her protocol     4.  GERD  Stable without any changes recommended  Continue omeprazole 20-40 mg  Please notify us if/when refills are needed     5.  Hyperlipidemia  Continue taking atorvastatin 40 mg daily without any changes  Lab work appears stable  CT cardiac score did note a value of 0, done in May 2024  We will continue monitoring closely      Please follow-up on an as-needed basis and/or in 6 months for your annual physical examination.

## 2025-04-16 ENCOUNTER — APPOINTMENT (OUTPATIENT)
Dept: PRIMARY CARE | Facility: CLINIC | Age: 60
End: 2025-04-16
Payer: COMMERCIAL

## 2025-04-19 LAB
ALBUMIN SERPL-MCNC: 4.1 G/DL (ref 3.6–5.1)
ALBUMIN SERPL-MCNC: 4.1 G/DL (ref 3.6–5.1)
ALBUMIN SERPL-MCNC: 4.2 G/DL (ref 3.6–5.1)
ALBUMIN/CREAT UR: 2 MG/G CREAT
ALP SERPL-CCNC: 108 U/L (ref 35–144)
ALP SERPL-CCNC: 110 U/L (ref 35–144)
ALT SERPL-CCNC: 53 U/L (ref 9–46)
ALT SERPL-CCNC: 54 U/L (ref 9–46)
ANION GAP SERPL CALCULATED.4IONS-SCNC: 7 MMOL/L (CALC) (ref 7–17)
ANION GAP SERPL CALCULATED.4IONS-SCNC: 7 MMOL/L (CALC) (ref 7–17)
AST SERPL-CCNC: 19 U/L (ref 10–35)
AST SERPL-CCNC: 20 U/L (ref 10–35)
BASOPHILS # BLD AUTO: 29 CELLS/UL (ref 0–200)
BASOPHILS NFR BLD AUTO: 0.5 %
BILIRUB SERPL-MCNC: 0.5 MG/DL (ref 0.2–1.2)
BILIRUB SERPL-MCNC: 0.6 MG/DL (ref 0.2–1.2)
BUN SERPL-MCNC: 34 MG/DL (ref 7–25)
BUN SERPL-MCNC: 35 MG/DL (ref 7–25)
BUN SERPL-MCNC: 35 MG/DL (ref 7–25)
BUN/CREAT SERPL: 16 (CALC) (ref 6–22)
CALCIUM SERPL-MCNC: 9.3 MG/DL (ref 8.6–10.3)
CALCIUM SERPL-MCNC: 9.4 MG/DL (ref 8.6–10.3)
CALCIUM SERPL-MCNC: 9.4 MG/DL (ref 8.6–10.3)
CHLORIDE SERPL-SCNC: 107 MMOL/L (ref 98–110)
CO2 SERPL-SCNC: 25 MMOL/L (ref 20–32)
CO2 SERPL-SCNC: 26 MMOL/L (ref 20–32)
CO2 SERPL-SCNC: 26 MMOL/L (ref 20–32)
CREAT SERPL-MCNC: 2.17 MG/DL (ref 0.7–1.35)
CREAT SERPL-MCNC: 2.2 MG/DL (ref 0.7–1.35)
CREAT SERPL-MCNC: 2.2 MG/DL (ref 0.7–1.35)
CREAT UR-MCNC: 101 MG/DL (ref 20–320)
EGFRCR SERPLBLD CKD-EPI 2021: 33 ML/MIN/1.73M2
EGFRCR SERPLBLD CKD-EPI 2021: 33 ML/MIN/1.73M2
EGFRCR SERPLBLD CKD-EPI 2021: 34 ML/MIN/1.73M2
EOSINOPHIL # BLD AUTO: 63 CELLS/UL (ref 15–500)
EOSINOPHIL NFR BLD AUTO: 1.1 %
ERYTHROCYTE [DISTWIDTH] IN BLOOD BY AUTOMATED COUNT: 12.7 % (ref 11–15)
EST. AVERAGE GLUCOSE BLD GHB EST-MCNC: 151 MG/DL
EST. AVERAGE GLUCOSE BLD GHB EST-SCNC: 8.4 MMOL/L
GLUCOSE SERPL-MCNC: 111 MG/DL (ref 65–99)
GLUCOSE SERPL-MCNC: 114 MG/DL (ref 65–99)
GLUCOSE SERPL-MCNC: 115 MG/DL (ref 65–99)
HBA1C MFR BLD: 6.9 %
HCT VFR BLD AUTO: 40.5 % (ref 38.5–50)
HGB BLD-MCNC: 13.1 G/DL (ref 13.2–17.1)
LYMPHOCYTES # BLD AUTO: 1328 CELLS/UL (ref 850–3900)
LYMPHOCYTES NFR BLD AUTO: 23.3 %
MCH RBC QN AUTO: 30.3 PG (ref 27–33)
MCHC RBC AUTO-ENTMCNC: 32.3 G/DL (ref 32–36)
MCV RBC AUTO: 93.8 FL (ref 80–100)
MICROALBUMIN UR-MCNC: 0.2 MG/DL
MONOCYTES # BLD AUTO: 371 CELLS/UL (ref 200–950)
MONOCYTES NFR BLD AUTO: 6.5 %
NEUTROPHILS # BLD AUTO: 3910 CELLS/UL (ref 1500–7800)
NEUTROPHILS NFR BLD AUTO: 68.6 %
PHOSPHATE SERPL-MCNC: 3.8 MG/DL (ref 2.5–4.5)
PHOSPHATE SERPL-MCNC: 3.8 MG/DL (ref 2.5–4.5)
PLATELET # BLD AUTO: 277 THOUSAND/UL (ref 140–400)
PMV BLD REES-ECKER: 8.9 FL (ref 7.5–12.5)
POTASSIUM SERPL-SCNC: 4.8 MMOL/L (ref 3.5–5.3)
POTASSIUM SERPL-SCNC: 4.9 MMOL/L (ref 3.5–5.3)
POTASSIUM SERPL-SCNC: 5.1 MMOL/L (ref 3.5–5.3)
PROT SERPL-MCNC: 6.6 G/DL (ref 6.1–8.1)
PROT SERPL-MCNC: 6.6 G/DL (ref 6.1–8.1)
RBC # BLD AUTO: 4.32 MILLION/UL (ref 4.2–5.8)
SODIUM SERPL-SCNC: 140 MMOL/L (ref 135–146)
SODIUM SERPL-SCNC: 140 MMOL/L (ref 135–146)
SODIUM SERPL-SCNC: 141 MMOL/L (ref 135–146)
TSH SERPL-ACNC: 1.39 MIU/L (ref 0.4–4.5)
WBC # BLD AUTO: 5.7 THOUSAND/UL (ref 3.8–10.8)

## 2025-04-20 NOTE — RESULT ENCOUNTER NOTE
Sugar slightly elevated at 115  Liver slightly elevated) ALT but the electrolytes within normal limits    Kidney function still shows a decrease with the filtration rate of 33% and the BUN/creatinine both elevated but relatively stable at 35 and 2.2 respectively    I will send out a message personally to the nephrologist to see if she can squeeze you into the office to follow-up appropriately    Complete blood cell count shows a slight anemia with hemoglobin at 13.1 but I am not fully concerned    Hemoglobin A1c at 6.9% which is relatively stable when compared to the previous at 6.9% as well    Thyroid within normal limits

## 2025-04-24 ENCOUNTER — PATIENT OUTREACH (OUTPATIENT)
Dept: CARE COORDINATION | Facility: CLINIC | Age: 60
End: 2025-04-24
Payer: COMMERCIAL

## 2025-04-24 NOTE — PROGRESS NOTES
Outreach call to patient following up on appointment with primary care provider. Left vm message for patient.   Mr. Gibbons followed up with DO Larissa Tate , RN   Nurse Care Manager   [facility]   [phone]

## 2025-04-28 PROBLEM — R07.89 CHEST PRESSURE: Status: ACTIVE | Noted: 2025-04-28

## 2025-04-30 ENCOUNTER — OFFICE VISIT (OUTPATIENT)
Dept: CARDIOLOGY | Facility: CLINIC | Age: 60
End: 2025-04-30
Payer: COMMERCIAL

## 2025-04-30 VITALS
SYSTOLIC BLOOD PRESSURE: 119 MMHG | HEIGHT: 75 IN | BODY MASS INDEX: 35.06 KG/M2 | HEART RATE: 86 BPM | WEIGHT: 282 LBS | DIASTOLIC BLOOD PRESSURE: 70 MMHG

## 2025-04-30 DIAGNOSIS — R07.9 CHEST PAIN, UNSPECIFIED TYPE: ICD-10-CM

## 2025-04-30 DIAGNOSIS — I10 ESSENTIAL HYPERTENSION: ICD-10-CM

## 2025-04-30 DIAGNOSIS — E11.22 TYPE 2 DM WITH CKD STAGE 3 AND HYPERTENSION (MULTI): ICD-10-CM

## 2025-04-30 DIAGNOSIS — E78.2 MIXED HYPERLIPIDEMIA: Primary | ICD-10-CM

## 2025-04-30 DIAGNOSIS — N18.30 TYPE 2 DM WITH CKD STAGE 3 AND HYPERTENSION (MULTI): ICD-10-CM

## 2025-04-30 DIAGNOSIS — I12.9 TYPE 2 DM WITH CKD STAGE 3 AND HYPERTENSION (MULTI): ICD-10-CM

## 2025-04-30 PROCEDURE — 3074F SYST BP LT 130 MM HG: CPT | Performed by: INTERNAL MEDICINE

## 2025-04-30 PROCEDURE — 99204 OFFICE O/P NEW MOD 45 MIN: CPT | Performed by: INTERNAL MEDICINE

## 2025-04-30 PROCEDURE — 3048F LDL-C <100 MG/DL: CPT | Performed by: INTERNAL MEDICINE

## 2025-04-30 PROCEDURE — 99212 OFFICE O/P EST SF 10 MIN: CPT | Performed by: INTERNAL MEDICINE

## 2025-04-30 PROCEDURE — 1036F TOBACCO NON-USER: CPT | Performed by: INTERNAL MEDICINE

## 2025-04-30 PROCEDURE — 4010F ACE/ARB THERAPY RXD/TAKEN: CPT | Performed by: INTERNAL MEDICINE

## 2025-04-30 PROCEDURE — 3008F BODY MASS INDEX DOCD: CPT | Performed by: INTERNAL MEDICINE

## 2025-04-30 PROCEDURE — 3078F DIAST BP <80 MM HG: CPT | Performed by: INTERNAL MEDICINE

## 2025-04-30 NOTE — PROGRESS NOTES
"Chief Complaint:   No chief complaint on file.     History of Present Illness     Cooper Gibbons \"Alirio\" is a 60 y.o. male presenting with chest pain.  The patient complains that on 4/12/25 while sitting and watching TV he developed acute substernal heavyiness like a 500# person sitting on his chest with SOB lasting 10 minutes.  No associated vomiting.  Was nauseated and sweating \"like crazy\".  EMS arrived and it had resolved (on its own) - admitted observation [facility] and discharged.  Echo done.  No recurrence.     The discomfort is exacerbated by none and relieved by none.  Th The pain is not positional and is not reproduced by palpation.  The patient has no history of known coronary artery disease.  The patient has not had a stress test within the last 12 months and has never had cardiac catheterization.  The patient does not experience chest discomfort with exertion.  There is no history of aortic aneurysm or thromboembolism.  The patient denies any systemic complaints including fever.   No cardiac Hx.  Risk factors DM, HTN, HPL and CKD.  Non-smoker. No Fhx of CAD.    Review of Systems  All pertinent systems have been reviewed and are negative except for what is stated in the history of present illness.    All other systems have been reviewed and are negative and noncontributory to this patient's current ailments.   .       Previous History     Past Medical History:  He has a past medical history of Acute kidney injury superimposed on stage 3a chronic kidney disease (04/12/2025), Chest pressure (04/28/2025), COVID-19 (02/21/2022), Elevated LFTs (04/12/2025), and Orthostatic hypotension (08/06/2020).    Past Surgical History:  He has a past surgical history that includes Ankle surgery (05/02/2018).      Social History:  He reports that he has never smoked. He has never used smokeless tobacco. He reports that he does not currently use alcohol. No history on file for drug use.    Family History:  Family History[1]   " "  Allergies:  Patient has no known allergies.    Outpatient Medications:  Current Outpatient Medications   Medication Instructions    amLODIPine (NORVASC) 5 mg, oral, Daily    atorvastatin (LIPITOR) 40 mg, oral, Daily    blood sugar diagnostic (Accu-Chek Guide test strips) strip USE TO TEST TWICE DAILY IN THE  MORNING FASTING AND 1 TO 2 HOURS AFTER LARGEST MEAL    cinnamon bark (CINNAMON ORAL) Daily    lancets (Accu-Chek Fastclix Lancet Drum) misc CHECK BLOOD SUGAR TWICE DAILY    losartan (COZAAR) 100 mg, oral, Daily    multivitamin with minerals tablet 1 tablet, Daily    needles, disposable (BD INTRADERMAL BEVEL NEEDLES MISC) Micro 32Gx 6mm; use as directed for once daily injection    NON FORMULARY Alcohol swabs: use as directed    omeprazole (PriLOSEC) 20 mg DR capsule TAKE 1 CAPSULE BY MOUTH ONCE  DAILY IN THE MORNING TAKE BEFORE A MEAL    Ozempic 2 mg, subcutaneous, Weekly       Physical Examination   Vitals:  Visit Vitals  /70 (BP Location: Right arm, Patient Position: Sitting, BP Cuff Size: Large adult)   Pulse 86   Ht 1.905 m (6' 3\")   Wt 128 kg (282 lb)   BMI 35.25 kg/m²   Smoking Status Never   BSA 2.6 m²    Physical Exam  Vitals reviewed.   Constitutional:       General: He is not in acute distress.     Appearance: Normal appearance.   HENT:      Head: Normocephalic and atraumatic.      Nose: Nose normal.   Eyes:      Conjunctiva/sclera: Conjunctivae normal.   Cardiovascular:      Rate and Rhythm: Normal rate and regular rhythm.      Pulses: Normal pulses.      Heart sounds: No murmur heard.  Pulmonary:      Effort: Pulmonary effort is normal. No respiratory distress.      Breath sounds: Normal breath sounds. No wheezing, rhonchi or rales.   Abdominal:      General: Bowel sounds are normal. There is no distension.      Palpations: Abdomen is soft.      Tenderness: There is no abdominal tenderness.   Musculoskeletal:         General: No swelling.      Right lower leg: No edema.      Left lower leg: No " edema.   Skin:     General: Skin is warm and dry.      Capillary Refill: Capillary refill takes less than 2 seconds.   Neurological:      General: No focal deficit present.      Mental Status: He is alert.   Psychiatric:         Mood and Affect: Mood normal.             Labs/Imaging/Cardiac Studies   I have personally reviewed the patient's available lab work, primary care appointment notes, pertinent imaging studies, and cardiac studies and have discussed them and my independent interpretation of those results with the patient and caregiver at this appointment.  All pertinent recent Emergency Department evaluations and Hospital admissions were also reviewed in detail with the patient and caregiver.    Reviewed CAC=0 5/10/24  Reviewed ECG, Labs, CXR and ED notes, Echo    Echo:  Transthoracic echo (TTE) complete  Result Date: 4/12/2025   [facility], [address] [place] [address]              Tel [phone] and Fax [phone] TRANSTHORACIC ECHOCARDIOGRAM REPORT  Patient Name:       LEXX Patterson Physician:   [id number] Madhu Santos MD Study Date:         4/12/2025           Ordering Provider:   [id number] KOBE DAWN MRN/PID:            [id number]            Fellow: Accession#:         [id number]        Nurse: Date of Birth/Age:  1965 / 60      Sonographer:         Vanessa Hutchinson RDCS                     years Gender assigned at  M                   Additional Staff: Birth: Height:             190.50 cm           Admit Date:          4/12/2025 Weight:             124.74 kg           Admission Status:    Observation -                                                              Priority discharge BSA / BMI:          2.51 m2 / 34.37     Encounter#:          [id number]                     kg/m2 Blood Pressure:     137/86 mmHg         Department Location: [facility]  [facility] Non                                                              Invasive Study Type:    TRANSTHORACIC ECHO (TTE) COMPLETE Diagnosis/ICD: Chest pain, unspecified-R07.9 Indication:    Chest Pain CPT Code:      Echo Complete w Full [id number] Patient History: Diabetes:          Yes Pertinent History: HTN and Hyperlipidemia. CKDIII. Study Detail: The following Echo studies were performed: M-Mode, 2D, Doppler and               color flow. Technically challenging study due to body habitus.               Definity used as a contrast agent for endocardial border               definition. Total contrast used for this procedure was 2 mL via IV               push.  PHYSICIAN INTERPRETATION: Left Ventricle: The left ventricular systolic function is normal, with a Soler's biplane calculated ejection fraction of 62%. There are no regional left ventricular wall motion abnormalities. The left ventricular cavity size is normal. There is mildly increased septal and mildly increased posterior left ventricular wall thickness. There is left ventricular concentric remodeling. Spectral Doppler shows a Grade I (impaired relaxation pattern) of left ventricular diastolic filling with normal left atrial filling pressure. Left Atrium: The left atrial size is normal. Right Ventricle: The right ventricle is normal in size. There is normal right ventricular global systolic function. Right Atrium: The right atrium is mildly dilated. Aortic Valve: The aortic valve is trileaflet. The aortic valve dimensionless index is 0.91. There is no evidence of aortic valve regurgitation. The peak instantaneous gradient of the aortic valve is 8 mmHg. The mean gradient of the aortic valve is 5 mmHg. Mitral Valve: The mitral valve is mildly thickened. There is trace mitral valve regurgitation. Tricuspid Valve: The tricuspid valve is structurally normal. There is trace tricuspid regurgitation. The Doppler estimated RVSP is within normal limits at 21.3  mmHg. Pulmonic Valve: The pulmonic valve is structurally normal. There is physiologic pulmonic valve regurgitation. Pericardium: Trivial pericardial effusion. Aorta: The aortic root is normal. There is mild dilatation of the aortic root. In comparison to the previous echocardiogram(s): There are no prior studies on this patient for comparison purposes.  CONCLUSIONS:  1. The left ventricular systolic function is normal, with a Soler's biplane calculated ejection fraction of 62%.  2. Spectral Doppler shows a Grade I (impaired relaxation pattern) of left ventricular diastolic filling with normal left atrial filling pressure.  3. There is normal right ventricular global systolic function.  4. Right ventricular systolic pressure is within normal limits. QUANTITATIVE DATA SUMMARY:  2D MEASUREMENTS:          Normal Ranges: LAs:             3.68 cm  (2.7-4.0cm) IVSd:            1.14 cm  (0.6-1.1cm) LVPWd:           1.12 cm  (0.6-1.1cm) LVIDd:           4.41 cm  (3.9-5.9cm) LVIDs:           2.63 cm LV Mass Index:   70 g/m2 LVEDV Index:     37 ml/m2 LV % FS          40.4 %  LEFT ATRIUM:                  Normal Ranges: LA Vol A4C:        68.0 ml    (22+/-6mL/m2) LA Vol A2C:        61.5 ml LA Vol BP:         66.0 ml LA Vol Index A4C:  27.1ml/m2 LA Vol Index A2C:  24.5 ml/m2 LA Vol Index BP:   26.3 ml/m2 LA Area A4C:       20.0 cm2 LA Area A2C:       19.4 cm2 LA Major Axis A4C: 5.0 cm LA Major Axis A2C: 5.2 cm LA Volume Index:   26.1 ml/m2 LA Vol A4C:        60.1 ml LA Vol A2C:        58.9 ml LA Vol Index BSA:  23.7 ml/m2  RIGHT ATRIUM:                 Normal Ranges: RA Vol A4C:        53.4 ml    (8.3-19.5ml) RA Vol Index A4C:  21.3 ml/m2 RA Area A4C:       17.9 cm2 RA Major Axis A4C: 5.1 cm  AORTA MEASUREMENTS:         Normal Ranges: Ao Sinus, d:        3.40 cm (2.1-3.5cm) Ao STJ, d:          3.40 cm (1.7-3.4cm) Asc Ao, d:          3.40 cm (2.1-3.4cm)  LV SYSTOLIC FUNCTION:                      Normal Ranges: EF-A4C View:     60 % (>=55%) EF-A2C View:    65 % EF-Biplane:     62 % LV EF Reported: 62 %  LV DIASTOLIC FUNCTION:             Normal Ranges: MV Peak E:             0.69 m/s    (0.7-1.2 m/s) MV Peak A:             0.62 m/s    (0.42-0.7 m/s) E/A Ratio:             1.11        (1.0-2.2) MV e'                  0.105 m/s   (>8.0) MV lateral e'          0.13 m/s MV medial e'           0.08 m/s MV A Dur:              121.79 msec E/e' Ratio:            6.55        (<8.0) a'                     0.14 m/s PulmV Sys Reagan:         57.22 cm/s PulmV Barr Reagan:        37.32 cm/s PulmV S/D Reagan:         1.53 PulmV A Revs Reagan:      18.08 cm/s PulmV A Revs Dur:      93.24 msec  MITRAL VALVE:          Normal Ranges: MV DT:        223 msec (150-240msec)  AORTIC VALVE:                     Normal Ranges: AoV Vmax:                1.45 m/s (<=1.7m/s) AoV Peak P.4 mmHg (<20mmHg) AoV Mean P.5 mmHg (1.7-11.5mmHg) LVOT Max Reagan:            1.39 m/s (<=1.1m/s) AoV VTI:                 30.78 cm (18-25cm) LVOT VTI:                27.95 cm LVOT Diameter:           2.32 cm  (1.8-2.4cm) AoV Area, VTI:           3.82 cm2 (2.5-5.5cm2) AoV Area,Vmax:           4.04 cm2 (2.5-4.5cm2) AoV Dimensionless Index: 0.91  RIGHT VENTRICLE: RV Basal 4.00 cm RV Mid   3.80 cm RV Major 8.1 cm TAPSE:   18.0 mm RV s'    0.12 m/s  TRICUSPID VALVE/RVSP:          Normal Ranges: Peak TR Velocity:     2.14 m/s Est. RA Pressure:     3 mmHg RV Syst Pressure:     21 mmHg  (< 30mmHg) IVC Diam:             1.33 cm  PULMONIC VALVE:          Normal Ranges: PV Accel Time:  91 msec  (>120ms) PV Max Reagan:     1.0 m/s  (0.6-0.9m/s) PV Max P.1 mmHg  PULMONARY VEINS: PulmV A Revs Dur: 93.24 msec PulmV A Revs Reagan: 18.08 cm/s PulmV Barr Reagan:   37.32 cm/s PulmV S/D Reagan:    1.53 PulmV Sys Reagan:    57.22 cm/s  AORTA: Asc Ao Diam 3.42 cm  [id number] Madhu Santos MD Electronically signed on 2025 at 4:01:33 PM  ** Final **          Assessment and Recommendations      Assessment/Plan       1. Chest pain, unspecified type  The patient presents with atypical anginal, anginal chest pain.  The ECG is non-ischemic.  The differential diagnosis includes CAD, gastrointestinal, pulmonary, and musculoskeletal etiologies.  There is no clinical evidence to suggest acute coronary syndrome, aortic dissection, or pulmonary embolism.  The ECG shows no evidence of ischemia.  An outpatient evaluation of the patient's chest pain is appropriate with a stress test which will be scheduled as soon as can be arranged. For severe and/or prolonged chest pain, the patient was instructed to call 911.     - Referral to Cardiology     2. Hypertension  The patient's blood pressure has been well-controlled at today's appointment or by recent primary care provider's measurements/home measurements and meets their goal blood pressure per the ACC/AHA guidelines.  The patient has been compliant with their anti-hypertensive medications and is following a low sodium/DASH diet. I advised continuation of their present medical treatment and lifestyle modification.      3. Hyperlipidemia  The patient's lipids are well controlled on chronic atorvastatin therapy and they are meeting their goal LDL cholesterol per the ACC/AHA guidelines.      Stress echo      López Menezes MD    Exclusive of any other services or procedures performed, I, López Menezes MD , spent 30 minutes in duration for this visit today.  This time consisted of chart review, obtaining history, and/or performing the exam as documented above as well as documenting the clinical information for the encounter in the electronic record, discussing treatment options, plans, and/or goals with patient, family, and/or caregiver, refilling medications, updating the electronic record, ordering medicines, lab work, imaging, referrals, and/or procedures as documented above and communicating [facility] other [facility]. I have discussed the results of  laboratory, radiology, and cardiology studies with the patient and their family/caregiver.         [1]   Family History  Problem Relation Name Age of Onset    Other (esrd on dialysis) Mother      Diabetes Mother      Multiple sclerosis Mother

## 2025-05-15 ENCOUNTER — APPOINTMENT (OUTPATIENT)
Dept: CARDIOLOGY | Facility: CLINIC | Age: 60
End: 2025-05-15
Payer: COMMERCIAL

## 2025-05-15 ENCOUNTER — PATIENT MESSAGE (OUTPATIENT)
Dept: PRIMARY CARE | Facility: CLINIC | Age: 60
End: 2025-05-15

## 2025-05-21 ENCOUNTER — TELEPHONE (OUTPATIENT)
Dept: CARDIOLOGY | Facility: CLINIC | Age: 60
End: 2025-05-21
Payer: COMMERCIAL

## 2025-05-27 ENCOUNTER — APPOINTMENT (OUTPATIENT)
Dept: CARDIOLOGY | Facility: CLINIC | Age: 60
End: 2025-05-27
Payer: COMMERCIAL

## 2025-07-18 ENCOUNTER — APPOINTMENT (OUTPATIENT)
Dept: PRIMARY CARE | Facility: CLINIC | Age: 60
End: 2025-07-18
Payer: COMMERCIAL

## 2025-07-18 VITALS
WEIGHT: 282 LBS | HEART RATE: 84 BPM | DIASTOLIC BLOOD PRESSURE: 80 MMHG | SYSTOLIC BLOOD PRESSURE: 130 MMHG | BODY MASS INDEX: 35.25 KG/M2 | OXYGEN SATURATION: 98 %

## 2025-07-18 DIAGNOSIS — E11.22 TYPE 2 DM WITH CKD STAGE 3 AND HYPERTENSION (MULTI): ICD-10-CM

## 2025-07-18 DIAGNOSIS — I12.9 TYPE 2 DM WITH CKD STAGE 3 AND HYPERTENSION (MULTI): ICD-10-CM

## 2025-07-18 DIAGNOSIS — E78.5 HYPERLIPIDEMIA, UNSPECIFIED HYPERLIPIDEMIA TYPE: ICD-10-CM

## 2025-07-18 DIAGNOSIS — N18.30 TYPE 2 DM WITH CKD STAGE 3 AND HYPERTENSION (MULTI): ICD-10-CM

## 2025-07-18 DIAGNOSIS — E66.01 SEVERE OBESITY (BMI 35.0-39.9) WITH COMORBIDITY (MULTI): ICD-10-CM

## 2025-07-18 DIAGNOSIS — I10 PRIMARY HYPERTENSION: ICD-10-CM

## 2025-07-18 DIAGNOSIS — K21.9 LARYNGOPHARYNGEAL REFLUX: ICD-10-CM

## 2025-07-18 PROCEDURE — 99214 OFFICE O/P EST MOD 30 MIN: CPT | Performed by: STUDENT IN AN ORGANIZED HEALTH CARE EDUCATION/TRAINING PROGRAM

## 2025-07-18 PROCEDURE — 1036F TOBACCO NON-USER: CPT | Performed by: STUDENT IN AN ORGANIZED HEALTH CARE EDUCATION/TRAINING PROGRAM

## 2025-07-18 PROCEDURE — 3075F SYST BP GE 130 - 139MM HG: CPT | Performed by: STUDENT IN AN ORGANIZED HEALTH CARE EDUCATION/TRAINING PROGRAM

## 2025-07-18 PROCEDURE — 3079F DIAST BP 80-89 MM HG: CPT | Performed by: STUDENT IN AN ORGANIZED HEALTH CARE EDUCATION/TRAINING PROGRAM

## 2025-07-18 PROCEDURE — 4010F ACE/ARB THERAPY RXD/TAKEN: CPT | Performed by: STUDENT IN AN ORGANIZED HEALTH CARE EDUCATION/TRAINING PROGRAM

## 2025-07-18 RX ORDER — OMEPRAZOLE 20 MG/1
20 CAPSULE, DELAYED RELEASE ORAL
Qty: 90 CAPSULE | Refills: 1 | Status: SHIPPED | OUTPATIENT
Start: 2025-07-18

## 2025-07-18 RX ORDER — AMLODIPINE BESYLATE 5 MG/1
5 TABLET ORAL DAILY
Qty: 90 TABLET | Refills: 1 | Status: SHIPPED | OUTPATIENT
Start: 2025-07-18

## 2025-07-18 RX ORDER — ATORVASTATIN CALCIUM 40 MG/1
40 TABLET, FILM COATED ORAL DAILY
Qty: 90 TABLET | Refills: 1 | Status: SHIPPED | OUTPATIENT
Start: 2025-07-18

## 2025-07-18 RX ORDER — LOSARTAN POTASSIUM 100 MG/1
100 TABLET ORAL DAILY
Qty: 90 TABLET | Refills: 1 | Status: SHIPPED | OUTPATIENT
Start: 2025-07-18

## 2025-07-18 RX ORDER — SEMAGLUTIDE 2.68 MG/ML
2 INJECTION, SOLUTION SUBCUTANEOUS WEEKLY
Qty: 9 ML | Refills: 1 | Status: SHIPPED | OUTPATIENT
Start: 2025-07-18

## 2025-07-18 NOTE — PROGRESS NOTES
"Subjective   Patient ID: Cooper Gibbons \"Alirio\" is a 60 y.o. male who presents for Hypertension, Diabetes, GERD, and Hyperlipidemia.  Today he is accompanied by alone.     HPI    1.  Healthcare maintenance  Formal physical examination is due during the fall  Overall feels stable and wishes to update she thinks as noted chart below  He was in the hospital earlier this year and even established with a cardiologist, Dr. Menezes  Ultimately, it was recommended to do an echo stress test but unfortunately he has yet to do so  Looking into getting this done in the near future    2. Hypertension  Continues to take losartan 100 mg daily as indicated in the chart alongside 5 mg daily  Continues to do well and is stable  Denies any cardiopulmonary symptoms  Requesting refills     3. Diabetes mellitus with CKD 3  Continues to take Ozempic as indicated in the chart that 2.0 mg once weekly  Most recent hemoglobin A1c was noted at 6.9%  Feels well and requesting refills to be sent out to his mail out pharmacy  Has a history of now being down over 60 pounds due to the medication  Recently followed up with nephrology as noted in the chart and is following closely    4.  GERD  Continues to take omeprazole 20-40 mg on an as-needed basis  Feels very well in regards to his acid reflux  Requesting refills as he just uses this very rarely     5. Hyperlipidemia  Continues to take atorvastatin 40 mg daily as indicated in chart  Cholesterol appears to be stable as indicated the chart  Denies any myalgias  Requesting refills to his mail out pharmacy  CT cardiac score was performed and showed a value of 0    Current Outpatient Medications on File Prior to Visit   Medication Sig Dispense Refill    blood sugar diagnostic (Accu-Chek Guide test strips) strip USE TO TEST TWICE DAILY IN THE  MORNING FASTING AND 1 TO 2 HOURS AFTER LARGEST MEAL 200 strip 3    cinnamon bark (CINNAMON ORAL) Take by mouth once daily. [place]      lancets (Accu-Chek " Fastclix Lancet Drum) misc CHECK BLOOD SUGAR TWICE DAILY 204 each 3    multivitamin with minerals tablet Take 1 tablet by mouth once daily.      needles, disposable (BD INTRADERMAL BEVEL NEEDLES MISC) Micro 32Gx 6mm; use as directed for once daily injection      NON FORMULARY Alcohol swabs: use as directed      [DISCONTINUED] amLODIPine (Norvasc) 5 mg tablet Take 1 tablet (5 mg) by mouth once daily. 90 tablet 1    [DISCONTINUED] atorvastatin (Lipitor) 40 mg tablet Take 1 tablet (40 mg) by mouth once daily. 90 tablet 1    [DISCONTINUED] losartan (Cozaar) 100 mg tablet Take 1 tablet (100 mg) by mouth once daily. 90 tablet 1    [DISCONTINUED] omeprazole (PriLOSEC) 20 mg DR capsule TAKE 1 CAPSULE BY MOUTH ONCE  DAILY IN THE MORNING TAKE BEFORE A MEAL 90 capsule 1    [DISCONTINUED] semaglutide (Ozempic) 2 mg/dose (8 mg/3 mL) pen injector Inject 2 mg under the skin 1 (one) time per week. 9 mL 1     No current facility-administered medications on file prior to visit.        No Known Allergies    Immunization History   Administered Date(s) Administered    Flu vaccine (IIV4), preservative free *Check age/dose* 01/31/2018, 10/29/2018, 11/13/2020, 10/26/2022, 11/10/2023    Flu vaccine, trivalent, preservative free, age 6 months and greater (Fluarix/Fluzone/Flulaval) 10/18/2024    Hep A, Unspecified 11/22/2013    Hepatitis B vaccine, adult *Check Product/Dose* 11/22/2013, 01/15/2014, 05/06/2014    Influenza, seasonal, injectable 10/26/2022    Pfizer Gray Cap SARS-CoV-2 04/09/2022    Pfizer Purple Cap SARS-CoV-2 05/01/2021, 05/22/2021    Pneumococcal conjugate vaccine, 20-valent (PREVNAR 20) 11/10/2023    Pneumococcal polysaccharide vaccine, 23-valent, age 2 years and older (PNEUMOVAX 23) 09/01/2021    SARS-CoV-2, Unspecified 04/09/2022    Tdap vaccine, age 7 year and older (BOOSTRIX, ADACEL) 08/19/2011, 06/05/2022    Zoster vaccine, recombinant, adult (SHINGRIX) 10/26/2022, 03/23/2023         Review of Systems  All pertinent  positive symptoms are included in the history of present illness.  All other systems have been reviewed and are negative and noncontributory to this patient's current ailments.     Objective   /80 (BP Location: Left arm, Patient Position: Sitting, BP Cuff Size: Large adult)   Pulse 84   Wt 128 kg (282 lb)   SpO2 98%   BMI 35.25 kg/m²   BSA: 2.6 meters squared  No visits with results within 1 Month(s) from this visit.   Latest known visit with results is:   Office Visit on 04/15/2025   Component Date Value Ref Range Status    TSH W/REFLEX TO FT4 04/18/2025 1.39  0.40 - 4.50 mIU/L Final    GLUCOSE 04/18/2025 115 (H)  65 - 99 mg/dL Final    Comment:               Fasting reference interval     For someone without known diabetes, a glucose value  between 100 and 125 mg/dL is consistent with  prediabetes and should be confirmed with a  follow-up test.         UREA NITROGEN (BUN) 04/18/2025 35 (H)  7 - 25 mg/dL Final    CREATININE 04/18/2025 2.20 (H)  0.70 - 1.35 mg/dL Final    EGFR 04/18/2025 33 (L)  > OR = 60 mL/min/1.73m2 Final    SODIUM 04/18/2025 140  135 - 146 mmol/L Final    POTASSIUM 04/18/2025 4.9  3.5 - 5.3 mmol/L Final    CHLORIDE 04/18/2025 107  98 - 110 mmol/L Final    CARBON DIOXIDE 04/18/2025 26  20 - 32 mmol/L Final    ELECTROLYTE BALANCE 04/18/2025 7  7 - 17 mmol/L (calc) Final    CALCIUM 04/18/2025 9.4  8.6 - 10.3 mg/dL Final    PROTEIN, TOTAL 04/18/2025 6.6  6.1 - 8.1 g/dL Final    ALBUMIN 04/18/2025 4.1  3.6 - 5.1 g/dL Final    BILIRUBIN, TOTAL 04/18/2025 0.5  0.2 - 1.2 mg/dL Final    ALKALINE PHOSPHATASE 04/18/2025 110  35 - 144 U/L Final    AST 04/18/2025 20  10 - 35 U/L Final    ALT 04/18/2025 54 (H)  9 - 46 U/L Final    WHITE BLOOD CELL COUNT 04/18/2025 5.7  3.8 - 10.8 Thousand/uL Final    RED BLOOD CELL COUNT 04/18/2025 4.32  4.20 - 5.80 Million/uL Final    HEMOGLOBIN 04/18/2025 13.1 (L)  13.2 - 17.1 g/dL Final    HEMATOCRIT 04/18/2025 40.5  38.5 - 50.0 % Final    MCV 04/18/2025 93.8   80.0 - 100.0 fL Final    MCH 04/18/2025 30.3  27.0 - 33.0 pg Final    MCHC 04/18/2025 32.3  32.0 - 36.0 g/dL Final    Comment: For adults, a slight decrease in the calculated MCHC  value (in the range of 30 to 32 g/dL) is most likely  not clinically significant; however, it should be  interpreted with caution in correlation with other  red cell parameters and the patient's clinical  condition.      RDW 04/18/2025 12.7  11.0 - 15.0 % Final    PLATELET COUNT 04/18/2025 277  140 - 400 Thousand/uL Final    MPV 04/18/2025 8.9  7.5 - 12.5 fL Final    ABSOLUTE NEUTROPHILS 04/18/2025 3,910  1,500 - 7,800 cells/uL Final    ABSOLUTE LYMPHOCYTES 04/18/2025 1,328  850 - 3,900 cells/uL Final    ABSOLUTE MONOCYTES 04/18/2025 371  200 - 950 cells/uL Final    ABSOLUTE EOSINOPHILS 04/18/2025 63  15 - 500 cells/uL Final    ABSOLUTE BASOPHILS 04/18/2025 29  0 - 200 cells/uL Final    NEUTROPHILS 04/18/2025 68.6  % Final    LYMPHOCYTES 04/18/2025 23.3  % Final    MONOCYTES 04/18/2025 6.5  % Final    EOSINOPHILS 04/18/2025 1.1  % Final    BASOPHILS 04/18/2025 0.5  % Final    HEMOGLOBIN A1c 04/18/2025 6.9 (H)  <5.7 % Final    Comment: For someone without known diabetes, a hemoglobin A1c  value of 6.5% or greater indicates that they may have   diabetes and this should be confirmed with a follow-up   test.     For someone with known diabetes, a value <7% indicates   that their diabetes is well controlled and a value   greater than or equal to 7% indicates suboptimal   control. A1c targets should be individualized based on   duration of diabetes, age, comorbid conditions, and   other considerations.     Currently, no consensus exists regarding use of  hemoglobin A1c for diagnosis of diabetes for children.          eAG (mg/dL) 04/18/2025 151  mg/dL Final    eAG (mmol/L) 04/18/2025 8.4  mmol/L Final       Physical Exam  CONSTITUTIONAL - well nourished, well developed, looks like stated age, in no acute distress, not ill-appearing, and not  tired appearing  SKIN - normal skin color and pigmentation, normal skin turgor without rash, lesions, or nodules visualized  HEAD - no trauma, normocephalic  EYES - normal external exam  LUNG - clear to auscultation, no wheezing, no crackles and no rales, good effort  CARDIAC - regular rate and regular rhythm, no skipped beats, no murmur  ABDOMEN - no organomegaly, soft, nontender, nondistended, normal bowel sounds  EXTREMITIES - no edema, no deformities  NEUROLOGICAL - normal balance, normal motor, no ataxia  PSYCHIATRIC - alert, pleasant and cordial, age-appropriate      Assessment/Plan   1.  Healthcare maintenance updates  I would recommend continue to follow-up with the cardiologist per their protocol  Also, formal physical examination is due in the fall of this year    2. Hypertension  Stable without any changes recommended  Continue the losartan at 100 mg alongside amlodipine 5 mg  I would like to have you monitor and record blood pressures at home  Blood pressure goal should be below 130/80, ideally 120/80     3.  Diabetes mellitus with CKD 3  Continue Ozempic as currently prescribed at 2 mg once weekly  We will continue monitoring A1c and lab work was ordered after today's visit  I would recommend diet and exercise at this time we will continue monitoring closely  Continue following up with a nephrologist per her protocol     4.  GERD  Stable without any changes recommended  Continue omeprazole 20-40 mg  Please notify us if/when refills are needed     5.  Hyperlipidemia  Continue taking atorvastatin 40 mg daily without any changes  Lab work appears stable  CT cardiac score did note a value of 0, done in May 2024  We will continue monitoring closely      Please follow-up on an as-needed basis and/or in 6 months for your annual physical examination.

## 2025-08-02 LAB
ALBUMIN SERPL-MCNC: 4.2 G/DL (ref 3.6–5.1)
ALP SERPL-CCNC: 90 U/L (ref 35–144)
ALT SERPL-CCNC: 22 U/L (ref 9–46)
ANION GAP SERPL CALCULATED.4IONS-SCNC: 11 MMOL/L (CALC) (ref 7–17)
AST SERPL-CCNC: 12 U/L (ref 10–35)
BILIRUB SERPL-MCNC: 0.4 MG/DL (ref 0.2–1.2)
BUN SERPL-MCNC: 30 MG/DL (ref 7–25)
CALCIUM SERPL-MCNC: 9.3 MG/DL (ref 8.6–10.3)
CHLORIDE SERPL-SCNC: 108 MMOL/L (ref 98–110)
CHOLEST SERPL-MCNC: 137 MG/DL
CHOLEST/HDLC SERPL: 3 (CALC)
CO2 SERPL-SCNC: 22 MMOL/L (ref 20–32)
CREAT SERPL-MCNC: 1.99 MG/DL (ref 0.7–1.35)
EGFRCR SERPLBLD CKD-EPI 2021: 38 ML/MIN/1.73M2
EST. AVERAGE GLUCOSE BLD GHB EST-MCNC: 169 MG/DL
EST. AVERAGE GLUCOSE BLD GHB EST-SCNC: 9.3 MMOL/L
GLUCOSE SERPL-MCNC: 131 MG/DL (ref 65–99)
HBA1C MFR BLD: 7.5 %
HDLC SERPL-MCNC: 46 MG/DL
LDLC SERPL CALC-MCNC: 75 MG/DL (CALC)
NONHDLC SERPL-MCNC: 91 MG/DL (CALC)
POTASSIUM SERPL-SCNC: 5 MMOL/L (ref 3.5–5.3)
PROT SERPL-MCNC: 6.6 G/DL (ref 6.1–8.1)
SODIUM SERPL-SCNC: 141 MMOL/L (ref 135–146)
TRIGL SERPL-MCNC: 84 MG/DL

## 2025-09-05 ENCOUNTER — APPOINTMENT (OUTPATIENT)
Dept: PRIMARY CARE | Facility: CLINIC | Age: 60
End: 2025-09-05
Payer: COMMERCIAL

## 2025-09-05 PROBLEM — R73.9 HYPERGLYCEMIA: Status: RESOLVED | Noted: 2025-09-05 | Resolved: 2025-09-05

## 2025-09-05 PROBLEM — J45.909 REACTIVE AIRWAY DISEASE (HHS-HCC): Status: RESOLVED | Noted: 2025-09-05 | Resolved: 2025-09-05

## 2025-09-05 PROBLEM — B07.9 VIRAL WART ON FINGER: Status: ACTIVE | Noted: 2025-09-05

## 2025-09-05 PROBLEM — L91.8 OTHER HYPERTROPHIC DISORDERS OF THE SKIN: Status: ACTIVE | Noted: 2019-01-22

## 2025-09-05 PROBLEM — H53.8 BLURRING OF VISUAL IMAGE: Status: RESOLVED | Noted: 2025-09-05 | Resolved: 2025-09-05

## 2025-09-05 PROBLEM — S29.011A INTERCOSTAL MUSCLE STRAIN: Status: ACTIVE | Noted: 2025-09-05

## 2025-09-05 PROBLEM — R79.89 ELEVATED LFTS: Status: RESOLVED | Noted: 2025-04-12 | Resolved: 2025-09-05

## 2025-09-05 PROBLEM — R73.9 HYPERGLYCEMIA: Status: ACTIVE | Noted: 2025-09-05

## 2025-09-05 PROBLEM — S83.242A ACUTE MEDIAL MENISCAL TEAR, LEFT, INITIAL ENCOUNTER: Status: RESOLVED | Noted: 2022-12-30 | Resolved: 2025-09-05

## 2025-09-05 PROBLEM — L91.8 OTHER HYPERTROPHIC DISORDERS OF THE SKIN: Status: RESOLVED | Noted: 2019-01-22 | Resolved: 2025-09-05

## 2025-09-05 PROBLEM — R07.89 CHEST PRESSURE: Status: RESOLVED | Noted: 2025-04-28 | Resolved: 2025-09-05

## 2025-09-05 PROBLEM — Z23 NEED FOR SHINGLES VACCINE: Status: RESOLVED | Noted: 2023-03-23 | Resolved: 2025-09-05

## 2025-09-05 PROBLEM — M76.60 ACHILLES TENDINITIS: Status: ACTIVE | Noted: 2025-09-05

## 2025-09-05 PROBLEM — H53.8 BLURRING OF VISUAL IMAGE: Status: ACTIVE | Noted: 2025-09-05

## 2025-09-05 PROBLEM — S29.011A INTERCOSTAL MUSCLE STRAIN: Status: RESOLVED | Noted: 2025-09-05 | Resolved: 2025-09-05

## 2025-09-05 PROBLEM — L57.9 SKIN CHANGES DUE TO CHRONIC EXPOSURE TO NONIONIZING RADIATION, UNSPECIFIED: Status: ACTIVE | Noted: 2019-01-22

## 2025-09-05 PROBLEM — S39.92XA INJURY OF LOW BACK: Status: ACTIVE | Noted: 2025-09-05

## 2025-09-05 PROBLEM — L57.9 SKIN CHANGES DUE TO CHRONIC EXPOSURE TO NONIONIZING RADIATION, UNSPECIFIED: Status: RESOLVED | Noted: 2019-01-22 | Resolved: 2025-09-05

## 2025-09-05 PROBLEM — S69.92XA: Status: RESOLVED | Noted: 2023-02-04 | Resolved: 2025-09-05

## 2025-09-05 PROBLEM — R55 PRE-SYNCOPE: Status: RESOLVED | Noted: 2025-09-05 | Resolved: 2025-09-05

## 2025-09-05 PROBLEM — N17.9 ACUTE KIDNEY INJURY SUPERIMPOSED ON STAGE 3A CHRONIC KIDNEY DISEASE: Status: RESOLVED | Noted: 2025-04-12 | Resolved: 2025-09-05

## 2025-09-05 PROBLEM — L91.8 CUTANEOUS SKIN TAGS: Status: RESOLVED | Noted: 2023-02-04 | Resolved: 2025-09-05

## 2025-09-05 PROBLEM — R42 DIZZINESS: Status: RESOLVED | Noted: 2025-09-05 | Resolved: 2025-09-05

## 2025-09-05 PROBLEM — D18.01 HEMANGIOMA OF SKIN AND SUBCUTANEOUS TISSUE: Status: ACTIVE | Noted: 2019-01-22

## 2025-09-05 PROBLEM — N18.31 ACUTE KIDNEY INJURY SUPERIMPOSED ON STAGE 3A CHRONIC KIDNEY DISEASE: Status: RESOLVED | Noted: 2025-04-12 | Resolved: 2025-09-05

## 2025-09-05 PROBLEM — J45.909 REACTIVE AIRWAY DISEASE (HHS-HCC): Status: ACTIVE | Noted: 2025-09-05

## 2025-09-05 PROBLEM — M79.673 HEEL PAIN: Status: RESOLVED | Noted: 2025-09-05 | Resolved: 2025-09-05

## 2025-09-05 PROBLEM — K21.9 LARYNGOPHARYNGEAL REFLUX: Status: ACTIVE | Noted: 2025-09-05

## 2025-09-05 PROBLEM — E66.9 OBESITY WITH BODY MASS INDEX 30 OR GREATER: Status: ACTIVE | Noted: 2025-09-05

## 2025-09-05 PROBLEM — M50.20 HERNIATION OF INTERVERTEBRAL DISC OF CERVICAL REGION: Status: ACTIVE | Noted: 2025-09-05

## 2025-09-05 PROBLEM — S39.92XA INJURY OF LOW BACK: Status: RESOLVED | Noted: 2025-09-05 | Resolved: 2025-09-05

## 2025-09-05 PROBLEM — D18.01 HEMANGIOMA OF SKIN AND SUBCUTANEOUS TISSUE: Status: RESOLVED | Noted: 2019-01-22 | Resolved: 2025-09-05

## 2025-09-05 PROBLEM — R55 PRE-SYNCOPE: Status: ACTIVE | Noted: 2025-09-05

## 2025-09-05 PROBLEM — B07.9 VIRAL WART ON FINGER: Status: RESOLVED | Noted: 2025-09-05 | Resolved: 2025-09-05

## 2025-09-05 PROBLEM — M76.60 ACHILLES TENDINITIS: Status: RESOLVED | Noted: 2025-09-05 | Resolved: 2025-09-05

## 2025-09-05 PROBLEM — M79.673 HEEL PAIN: Status: ACTIVE | Noted: 2025-09-05

## 2025-09-05 PROBLEM — R42 DIZZINESS: Status: ACTIVE | Noted: 2025-09-05

## 2025-09-05 PROBLEM — S83.242A ACUTE MEDIAL MENISCAL TEAR, LEFT, INITIAL ENCOUNTER: Status: ACTIVE | Noted: 2022-12-30

## 2025-09-06 LAB
APTT PPP: 23 SEC (ref 23–32)
BASOPHILS # BLD AUTO: 20 CELLS/UL (ref 0–200)
BASOPHILS NFR BLD AUTO: 0.2 %
EOSINOPHIL # BLD AUTO: 40 CELLS/UL (ref 15–500)
EOSINOPHIL NFR BLD AUTO: 0.4 %
ERYTHROCYTE [DISTWIDTH] IN BLOOD BY AUTOMATED COUNT: 12.9 % (ref 11–15)
HCT VFR BLD AUTO: 41.9 % (ref 38.5–50)
HGB BLD-MCNC: 14.2 G/DL (ref 13.2–17.1)
INR PPP: 1
LYMPHOCYTES # BLD AUTO: 1818 CELLS/UL (ref 850–3900)
LYMPHOCYTES NFR BLD AUTO: 18 %
MCH RBC QN AUTO: 31.3 PG (ref 27–33)
MCHC RBC AUTO-ENTMCNC: 33.9 G/DL (ref 32–36)
MCV RBC AUTO: 92.5 FL (ref 80–100)
MONOCYTES # BLD AUTO: 626 CELLS/UL (ref 200–950)
MONOCYTES NFR BLD AUTO: 6.2 %
NEUTROPHILS # BLD AUTO: 7595 CELLS/UL (ref 1500–7800)
NEUTROPHILS NFR BLD AUTO: 75.2 %
PLATELET # BLD AUTO: 325 THOUSAND/UL (ref 140–400)
PMV BLD REES-ECKER: 9.6 FL (ref 7.5–12.5)
PROTHROMBIN TIME: 10.3 SEC (ref 9–11.5)
RBC # BLD AUTO: 4.53 MILLION/UL (ref 4.2–5.8)
WBC # BLD AUTO: 10.1 THOUSAND/UL (ref 3.8–10.8)

## 2025-12-05 ENCOUNTER — APPOINTMENT (OUTPATIENT)
Dept: PRIMARY CARE | Facility: CLINIC | Age: 60
End: 2025-12-05
Payer: COMMERCIAL

## 2026-03-16 ENCOUNTER — APPOINTMENT (OUTPATIENT)
Dept: NEPHROLOGY | Facility: CLINIC | Age: 61
End: 2026-03-16
Payer: COMMERCIAL